# Patient Record
Sex: FEMALE | Race: WHITE
[De-identification: names, ages, dates, MRNs, and addresses within clinical notes are randomized per-mention and may not be internally consistent; named-entity substitution may affect disease eponyms.]

---

## 2017-01-09 ENCOUNTER — HOSPITAL ENCOUNTER (OUTPATIENT)
Age: 72
Discharge: HOME | End: 2017-01-09
Payer: MEDICARE

## 2017-01-09 DIAGNOSIS — K21.9: Primary | ICD-10-CM

## 2017-01-09 DIAGNOSIS — K44.9: ICD-10-CM

## 2017-01-09 DIAGNOSIS — R05: ICD-10-CM

## 2017-01-09 PROCEDURE — 74220 X-RAY XM ESOPHAGUS 1CNTRST: CPT

## 2017-01-23 ENCOUNTER — HOSPITAL ENCOUNTER (OUTPATIENT)
Age: 72
Discharge: HOME | End: 2017-01-23
Payer: MEDICARE

## 2017-01-23 DIAGNOSIS — Z87.891: ICD-10-CM

## 2017-01-23 DIAGNOSIS — G23.1: Primary | ICD-10-CM

## 2017-01-23 DIAGNOSIS — Z66: ICD-10-CM

## 2017-01-23 DIAGNOSIS — Z51.5: ICD-10-CM

## 2017-01-23 DIAGNOSIS — F32.9: ICD-10-CM

## 2017-01-23 DIAGNOSIS — Z91.81: ICD-10-CM

## 2017-03-02 ENCOUNTER — HOSPITAL ENCOUNTER (OUTPATIENT)
Age: 72
Discharge: HOME | End: 2017-03-02
Payer: MEDICARE

## 2017-03-05 ENCOUNTER — HOSPITAL ENCOUNTER (EMERGENCY)
Age: 72
Discharge: HOME | End: 2017-03-05
Payer: MEDICARE

## 2017-03-05 ENCOUNTER — HOSPITAL ENCOUNTER (OUTPATIENT)
Age: 72
Discharge: ANOTHER HOSPITAL | End: 2017-03-05
Payer: MEDICARE
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
## 2017-03-05 DIAGNOSIS — I10: ICD-10-CM

## 2017-03-05 DIAGNOSIS — G23.1: ICD-10-CM

## 2017-03-05 DIAGNOSIS — F03.90: ICD-10-CM
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
## 2017-03-05 DIAGNOSIS — Z79.82: ICD-10-CM

## 2017-03-05 DIAGNOSIS — Z79.84: ICD-10-CM

## 2017-03-05 DIAGNOSIS — S01.01XA: Primary | ICD-10-CM

## 2017-03-05 DIAGNOSIS — Z91.81: ICD-10-CM

## 2017-03-05 DIAGNOSIS — E11.9: ICD-10-CM

## 2017-04-05 ENCOUNTER — HOSPITAL ENCOUNTER (OUTPATIENT)
Age: 72
Discharge: HOME | End: 2017-04-05
Payer: MEDICARE

## 2017-04-05 DIAGNOSIS — Z66: ICD-10-CM

## 2017-04-05 DIAGNOSIS — F32.3: ICD-10-CM

## 2017-04-05 DIAGNOSIS — R13.10: ICD-10-CM

## 2017-04-05 DIAGNOSIS — Z91.81: ICD-10-CM

## 2017-04-05 DIAGNOSIS — G23.1: ICD-10-CM

## 2017-04-05 DIAGNOSIS — N39.0: ICD-10-CM

## 2017-04-05 DIAGNOSIS — Z51.5: Primary | ICD-10-CM

## 2017-05-02 ENCOUNTER — HOSPITAL ENCOUNTER (OUTPATIENT)
Age: 72
Discharge: HOME | End: 2017-05-02
Payer: MEDICARE

## 2017-05-02 DIAGNOSIS — Z66: ICD-10-CM

## 2017-05-02 DIAGNOSIS — Z91.81: ICD-10-CM

## 2017-05-02 DIAGNOSIS — Z87.440: ICD-10-CM

## 2017-05-02 DIAGNOSIS — M25.512: ICD-10-CM

## 2017-05-02 DIAGNOSIS — R13.10: ICD-10-CM

## 2017-05-02 DIAGNOSIS — E11.9: ICD-10-CM

## 2017-05-02 DIAGNOSIS — G23.1: ICD-10-CM

## 2017-05-02 DIAGNOSIS — K21.9: ICD-10-CM

## 2017-05-02 DIAGNOSIS — F43.21: ICD-10-CM

## 2017-05-02 DIAGNOSIS — Z51.5: Primary | ICD-10-CM

## 2017-05-31 ENCOUNTER — HOSPITAL ENCOUNTER (OUTPATIENT)
Dept: HOSPITAL 76 - PC | Age: 72
Discharge: HOME | End: 2017-05-31
Attending: NURSE PRACTITIONER
Payer: MEDICARE

## 2017-05-31 DIAGNOSIS — R13.10: ICD-10-CM

## 2017-05-31 DIAGNOSIS — F32.9: ICD-10-CM

## 2017-05-31 DIAGNOSIS — Z87.440: ICD-10-CM

## 2017-05-31 DIAGNOSIS — Z91.81: ICD-10-CM

## 2017-05-31 DIAGNOSIS — G23.1: ICD-10-CM

## 2017-05-31 DIAGNOSIS — M25.512: ICD-10-CM

## 2017-05-31 DIAGNOSIS — Z51.5: Primary | ICD-10-CM

## 2017-05-31 DIAGNOSIS — Z66: ICD-10-CM

## 2017-05-31 NOTE — CONSULTATION NOTE
DATE OF CONSULTATION: 2017 00:00:00

 

REQUESTING PROVIDER: Cherrie Alas MD.

 

TIME OF VISIT: [id number].

 

TOPIC: Followup palliative care consult.

 

Thank you, Dr. Alas for asking the palliative care consult service to be involved in the care of y
our patient and continue to provide support for symptom management and goals of care, as well as barrera
sition planning.

 

The patient is seen in her home setting, which is the dementia unit at [facility]. It is considerable and taxing effort for her to leave the home. She has progressive symptoms 
and sequela from her progressive supranuclear palsy.

 

BRIEF HISTORY OF PRESENT ILLNESS UPDATE: This is a zen 72-year-old woman who has worsening progres
sive supranuclear palsy originally noted as far as symptoms in . She has been having rapid functi
onal decline over the last several months to a year with visual deficits worsening, some change in he
r cognition with impulsivity and difficulty with choking and swallowing, as well as functional declin
e. In the context of progression of her disease she would be considered late stage. I am seeing her t
morris because she had told the staff that she had wanted to die. She reports that she is uncomfortable
 all the time. Staff reports she has been intermittently tearful and has had a sad weekend. Has not h
ad any suicidal action. I did followup with Chidi, who is her sister, she had the same experience, t
he patient wanted to "kill herself" and I am trying to discern the patient's current level of distres
s and level of depression today. The patient does admit to feeling like she is dying. Does report she
 is somewhat scared and anxious about this. When asked if she wanted further information she declined
. She does express feelings of overwhelming loss, recognizing she is not going to return to any level
 of independence and continues to become more and more dependent. The patient is quite tearful, able 
to express her sadness and did explore her feelings around this. This is the first time she has reall
y admitted to depression beyond grief and depressive symptoms,  and is willing to explore antidepress
ants in the context of this. She is in a single room now. Her sister is doing as much as she can to b
reak up the isolation and provide activities. The patient appreciates this, does enjoy human interact
ion, field trips and activities. She had been out on the bus today.

 

She does appear to have continued toning back in position, stiffening. She reports her vision changes
 are worsening, she sits with her head back and pressure on her neck, up most of the time. She has ha
d increased left shoulder pain, most recently has been trialed on tramadol 50 mg b.i.d. from her [facility] physician with some relief, but not significant improvement. She did have a fall last week t
hat did result in some left rib pain. On examination, she has some point tenderness just below her le
ft breast. Unable to palpate any displaced fracture. She is able to be assisted sitting and standing 
without any sign of pain behaviors. She was able to weight bear and ambulate a few steps. She is work
ing with physical therapy on a private-pay basis through [facility]. They have been working on stab
ilizing and slowing her gait by adding weight to her walker, stretching and working on lowering her t
one of her cervical extensors in hopes to help her swelling and posture and they are seeing her about
 2 times a week. She does have the right to refuse this and we did explore also accessing her benefit
 to be able to access speech therapy as well. She has been evaluated by Nandini Fairchild and would like t
o continue to work with her.

 

When asked what the patient worries about most, it continues to be her family. She does feel like a b
urden. This is both Chidi, her sister and her ex- Dexter who visits her once a week. She is ab
le to express some anticipatory grieving over leaving them and the sadness that they are going to fee
l upon her death.

 

SYMPTOM BURDEN: As noted above. Left shoulder pain does appear to be worsening as far severity. She d
oes report fatigue, does report she is sleeping better. She reports her appetite is good. She does pe
rceive her quality of life as quite limited.

 

CODE STATUS: THE PATIENT IS A DO NOT ATTEMPT RESUSCITATION, LIMITED ADDITIONAL INTERVENTIONS, USE OF 
ANTIBIOTICS IF LIFE CAN BE PROLONGED AND TRIAL OF MEDICALLY ASSISTED NUTRITION IF IT WAS GOING TO SEBASTIEN MCINTYRE QUALITY OF LIFE. ABRAHAM DEL CID IS HER SISTER AND DURABLE [facility]  FOR MEDICAL A
FFAIRS. HER PHONE NUMBER [phone], AND HER CELL [phone].

 

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
t age 55 related to her  was a bit older wanting her to be available to take care of him. Unfo
rtunately, her   last year in a car accident. Her family is very much supportive. She has 
lost her daughter to cancer. She does have a son who does visit her intermittently. She does have gra
ndchildren that check on her regularly as well. She is Jew by background and receives quite a bi
t of support from the ongoing weekly visits from the palliative care .

 

PERFORMANCE STATUS: The patient does need major assistance and cueing for all transfers. She does hav
e trouble with her posture given her vision limitations and her tendency to tip. She has had some imp
ulsivity. She has now had another fall and would put her at a palliative care performance status at 5
0%, of course, what is concerning is her progressive dysphagia. She did see the speech therapist who 
did progress her diet and provided some fairly prescriptive instructions as far as precautions. Her s
ister is working on getting someone to be able to feed her. She has found someone for meals on Tuesda
y, Thursday all 3 meals, and  lunch and dinner.

 

REVIEW OF SYSTEMS:

ENT: Reports continued vision loss, is unable to really describe, but she can still see.

CARDIOVASCULAR: Denies chest pain, no further GERD.

RESPIRATORY: Denies shortness of breath though she does have intermittent choking with eating.

GASTROINTESTINAL: She is incontinent of stool, reports her appetite has been good.

GENITOURINARY: She most recently was treated last week with Cipro. She denies any further signs or sy
mptoms. She reports she is aware of when she has burning and foul odor.

MUSCULOSKELETAL: Continues to have muscle stiffness and intermittent tremors and some freezing. She i
s quite stiff when trying to move.

INTEGUMENTARY: Confirmed with caregivers no skin breakdown.

NEUROLOGIC: She does have some intermittent short-term memory deficits though she is able to particip
ate in the conversation. Her speech is getting much more difficult to understand, so it is hard to re
ally get a clear picture of her cognitive status though she was quite engaged and conversant today, p
articularly around her emotional distress.

PSYCHIATRIC: She does admit to grief and loss and does appear somewhat anxious, particularly around t
he recognition she is dying from her disease process.

ENDOCRINE: No history of diabetes or hypothyroidism.

HEMATOLOGIC/IMMUNOLOGIC: Recently treated for a urinary tract infection last week. Unfortunately was 
not able to get a culture and sensitivity. It was mixed corinne.

 

PHYSICAL EXAMINATION:

GENERAL: She is lying in bed. She does have her neck flexed back. She is gazing at me, able to make e
ye contact. Her speech is very soft and difficult to understand.

ENT: Mucous membranes are moist, no signs or symptoms of candidiasis.

NECK: Slightly thickened. Trachea midline.

RESPIRATORY: Breath sounds are diminished but clear.

CARDIOVASCULAR: Her temperature 96, O2 saturation on room air 95%, pulse is 68, blood pressure was 11
2/64.

EXTREMITIES: No lower extremity edema. She does need major assistance from getting from sitting to st
anding.

RESPIRATORY: No cough noted during conversation.

 

PALLIATIVE CARE DISCUSSION: Who is present, myself and the patient. It was as above, the patient admi
tted to the realization that she is dying and that things are not going to get better. Voiced concern
s over all the things that she has yet to do. She is worried of course about her family, and does adm
it to depression, is quite tearful through our conversation. In our discussion, we did discuss trying
 to address her looking at her pain, as well as her trying a medication for her depression. Unfortuna
tely at that point in time, I did not recognize reviewing the change of her pain medication. She just
 presents with overwhelming feeling of sadness and appears to be in fairly significant amount of exis
tential distress. 

 

I did follow up with a long lengthy conversation again with Chidi, her DPOA, recognizing that the pa
joel's awareness that she is dying. Had expressed to her sister, as well, "that I don't want it die.
" She has been trying to put together more support, more interactions, things that might improve her 
quality of life, recognizing that her quantity of life is limited. She has made arrangements for new 
full size bed with the ability to elevate the head. She continues to allow whatever services that [facility]
[place] be of help to her and take her out for rides, anything to do some work to make her life more kenny
able. We did broach the fact that the patient at some point will hit a decision point that may look l
rasta transitioning to hospice, particularly the patient does not want to extend her current quality of
 life or her perceived suffering. We did agree though that at this point in time we would address her
 issue of discomfort and try and lighten her depression with some medication. Reassurance and recogni
tion was given of what she is currently providing for her sister.

 

IMPRESSION: This is a 72-year-old woman with progressive supranuclear palsy who continues to have wor
sening functional and some cognitive decline. She does present today with major depressive disorder w
ith persistent pervasive feelings of sadness and distress. She is appropriately grieving her current 
loss of quality of life and has agreed to trial antidepressant recognizing of course this may or may 
not be of help to her. She continues to have pain and discomfort, suspect most likely acute pain from
 her ribs is a contusion.

 

RECOMMENDATIONS/COUNSELING DONE:

1. Progressive supranuclear palsy continues with both functional and considerable decline. It is diff
icult to tease out cognitive decline. Will have physical therapy come back and do an evaluation for s
afety, positioning and any equipment adjustments, as well as to address if there is further intervent
ion available for her pain relief of her left shoulder. Also, will have speech therapy come to help w
ith caregiver training for feeding and to assist with communication and voice quality.

2. Dysphagia. They do have an evaluation done from Nandini Fairchild, speech therapy with diet modificatio
ns and instructions for caregivers to follow. Nandini would like to continue to follow her to be able t
o modify as appropriate.

3. Left shoulder pain. I will go ahead and discontinue the Tramadol 50 mg b.i.d. secondary to its int
eractions with antidepressants. I did consult with her PCP and did in agreement to move forward with 
an opioids, will start with 1/2 tab t.i.d. and evaluate her tolerance and make sure she has no sedati
on, nausea, or difficulty with the medication with confusion, then moved to a full tab t.i.d. for bet
ter pain control. Did discuss with her sister related concerns about rib fracture. The patient jorge luis cornejo does not present with displaced symptoms. Would not recommend an x-ray at this point in time, as 
it would not change our treatment plan.

4. Major depressive disorder, poorly controlled. The patient now willing to consider antidepressant a
nd will go ahead and start her on escitalopram 10 mg daily and titrate accordingly. Orders were writt
en and are sent to facility and [facility]. Counseling was provided and acknowledged normalizing her cu
rrent feelings of loss and concern about her eminent decline related to her disease process.

5. Advanced care planning. I have spoken with her sister, Chidi, as far as moving forward on goals o
f care in the context of her continued current decline, it is a quite complex situation. She is at hi
gh risk for sequela of a fall or aspiration. I suspect we are moving into the 6 months or less progno
sis, the need to further define this patient's goals of care.

 

FACE-TO-FACE for Home health is considerable and taxing effort for the patient to leave the facility 
to receive services. She requires PT for evaluation of current safety equipment, the need for transfe
r retaining, OT for management of left shoulder pain for "frozen shoulder" and ST for assessment and 
caregiver training for feeding techniques, as well as communication.

 

TIME SPENT: 60 minutes with greater than 50% of this done in counseling and coordination of care with
in the facility with staff, followup with sister, review of symptom burden and anticipatory guidance.


 

 

 

DD:2017 18:47:00  DT: 2017 20:14  JOB #: [id number]  EXT JOB #:[id number]

## 2017-06-15 ENCOUNTER — HOSPITAL ENCOUNTER (OUTPATIENT)
Dept: HOSPITAL 76 - EMS | Age: 72
Discharge: ANOTHER HOSPITAL | End: 2017-06-15
Attending: SURGERY
Payer: MEDICARE

## 2017-06-15 ENCOUNTER — HOSPITAL ENCOUNTER (EMERGENCY)
Dept: HOSPITAL 76 - ED | Age: 72
Discharge: HOME | End: 2017-06-15
Payer: MEDICARE

## 2017-06-15 VITALS — DIASTOLIC BLOOD PRESSURE: 75 MMHG | SYSTOLIC BLOOD PRESSURE: 159 MMHG

## 2017-06-15 DIAGNOSIS — S01.01XA: Primary | ICD-10-CM

## 2017-06-15 DIAGNOSIS — Z79.84: ICD-10-CM

## 2017-06-15 DIAGNOSIS — G23.1: ICD-10-CM

## 2017-06-15 DIAGNOSIS — I10: ICD-10-CM
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
## 2017-06-15 DIAGNOSIS — S00.03XA: ICD-10-CM
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
## 2017-06-15 DIAGNOSIS — E11.9: ICD-10-CM
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
## 2017-06-15 DIAGNOSIS — F03.90: ICD-10-CM

## 2017-06-15 DIAGNOSIS — Z79.82: ICD-10-CM

## 2017-06-15 PROCEDURE — 72125 CT NECK SPINE W/O DYE: CPT

## 2017-06-15 PROCEDURE — 99284 EMERGENCY DEPT VISIT MOD MDM: CPT

## 2017-06-15 PROCEDURE — 70450 CT HEAD/BRAIN W/O DYE: CPT

## 2017-06-15 PROCEDURE — 12001 RPR S/N/AX/GEN/TRNK 2.5CM/<: CPT

## 2017-06-15 NOTE — ED PHYSICIAN DOCUMENTATION
PD HPI HEAD INJURY





- Stated complaint


Stated Complaint: GLF





- Chief complaint


Chief Complaint: General





- History obtained from


History obtained from: EMS





- History of Present Illness


Mechanism of head injury: Fell, Laceration


Where head injury occurred: Home


Timing - onset: Enter  time (0230), Today


Location of injury: Back


Quality of pain: Pain


Associated symptoms: Amnesia.  No: LOC, AMS, Nausea / vomiting, Neck pain, 

Paresthesias, Seizures, Ear drainage, Nasal drainage


Symptoms improve with: Rest


Symptoms worsen with: Palpation, Movement


Contributing factors: No: Anticoagulated


Similar symptoms before: Has not had sx before


Recently seen: Not recently seen





- Additional information


Additional information: 





73 y/o female with advanced dementia due to supranuclear palsy did not push her 

call button to go to the bathroom and fell in the bathroom striking the back of 

her head. She denies neck pain, nausea or dizziness. 





Review of Systems


Unable to obtain: Dementia


Constitutional: denies: Fever


Respiratory: denies: Cough


GI: denies: Vomiting, Diarrhea


Neurologic: denies: Generalized weakness, Focal weakness, Numbness





PD PAST MEDICAL HISTORY





- Past Medical History


Past Medical History: Yes


Cardiovascular: Hypertension, High cholesterol


Respiratory: None


Neuro: Dementia


Endocrine/Autoimmune: Type 2 diabetes


GI: None


: None


HEENT: None


Psych: None


Musculoskeletal: None


Derm: None





- Past Surgical History


Past Surgical History: Yes


General: Appendectomy


/GYN: Hysterectomy





- Present Medications


Home Medications: 


 Ambulatory Orders











 Medication  Instructions  Recorded  Confirmed


 


Aspirin [Aspir 81] 81 mg PO DAILY 09/24/13 03/05/17


 


Losartan [Cozaar] 25 mg PO DAILY 09/24/13 03/05/17


 


Metformin HCl [Glumetza] 500 mg PO BID 09/24/13 03/05/17


 


Pravastatin Sodium 40 mg PO HS 09/24/13 03/05/17


 


Brimonidine 0.2% Ophth Drops 1 drops BID 03/05/17 03/05/17





[Alphagan P 0.2% Ophth Drops]   


 


Dorzolamide 2% Ophth Drops 1 drops BID 03/05/17 03/05/17





[Trusopt 2% Ophth Drops]   


 


Erythromycin Base [Erythromycin 1 applic BID 03/05/17 03/05/17





Ophthalmic Ointment]   


 


Latanoprost 0.005% Ophth Drops 1 drops DAILY 03/05/17 03/05/17





[Xalatan Ophth Drops]   














- Allergies


Allergies/Adverse Reactions: 


 Allergies











Allergy/AdvReac Type Severity Reaction Status Date / Time


 


Iodinated Contrast- Oral and Allergy Severe Respiratory Verified 06/15/17 03:16





IV Dye     





[Iodinated Contrast Media -     





IV Dye]     


 


bee pollen [Bee Pollen] Allergy  Hives Verified 06/15/17 03:16














- Social History


Does the pt smoke?: No


Smoking Status: Never smoker


Does the pt drink ETOH?: No


Does the pt have substance abuse?: No





- Immunizations


Immunizations are current?: Yes





- POLST


Patient has POLST: No





PD ED PE NORMAL





- Vitals


Vital signs reviewed: Yes (hypertensive )





- General


General: Well developed/nourished, Other (The patient has a wide open eye stare 

and looks surprised. )





- HEENT


HEENT: Other (There is limited EOM. There is a 2cm laceration to the left 

occiput with a tender hematoma and no crepitance. )





- Neck


Neck: Other (The neck is rigid and extended but there is no bony tenderness. )





- Cardiac


Cardiac: RRR, No murmur





- Respiratory


Respiratory: No respiratory distress, Clear bilaterally





- Abdomen


Abdomen: Soft, Non tender





- Back


Back: No CVA TTP, No spinal TTP





- Derm


Derm: Normal color, Warm and dry, No rash





- Extremities


Extremities: No deformity, No edema





Results





- Vitals


Vitals: 


 Vital Signs - 24 hr











  06/15/17





  03:12


 


Temperature 37.1 C


 


Heart Rate 81


 


Respiratory 20





Rate 


 


Blood Pressure 163/101 H


 


O2 Saturation 96








 Oxygen











O2 Source                      Room air

















- Rads (name of study)


  ** CT cervical spine


Radiology: Prelim report reviewed (Impression: Stable degenerative changes in 

the cervical spine since 03/05/2017. No fracture is identified.), EMP read 

indepedently, See rad report





  ** CT head


Radiology: Prelim report reviewed (Impression: No acute or focal intracranial 

abnormality.), EMP read indepedently, See rad report





Procedures





- Laceration (location)


  ** scalp


Length in cm: 2


Wound type: Irregular, Clean


Neurovascular status: Sensory intact, Motor intact, Vascular intact


Anesthesia: Lidocaine 1%


Wound Preparation: Hibiclens, Irrigated copiously NS, Wound explored, To the 

base


Skin layer closure: Staples


Other: Patient tolerated well, No complications, Tetanus UTD


Complexity: Simple





PD MEDICAL DECISION MAKING





- ED course


Complexity details: reviewed old records, reviewed results, re-evaluated patient

, considered differential, d/w patient


ED course: 





73 y/o female with progressive supranuclear palsy fell in the bathroom this 

evening unwitnessed. She has a laceration to the scalp and this is stapled. CT 

of the head and neck were negative for acute. 





Departure





- Departure


Disposition: 01 Home, Self Care


Clinical Impression: 


Fall from slip, trip, or stumble


Qualifiers:


 Encounter type: initial encounter Qualified Code(s): W01.0XXA - Fall on same 

level from slipping, tripping and stumbling without subsequent striking against 

object, initial encounter





Scalp laceration


Qualifiers:


 Encounter type: initial encounter Qualified Code(s): S01.01XA - Laceration 

without foreign body of scalp, initial encounter


Condition: Stable


Instructions:  ED Laceration Scalp Stitch Or Stap


Follow-Up: 


Cherrie Alas DO [Provider Admit Priv/Credential] - 


Comments: 


staples out in 7-10 days

## 2017-06-15 NOTE — CT PRELIMINARY REPORT
Accession: [id number]

Exam: CT Cervical Spine W/O

 

IMPRESSION: Stable degenerative changes in the cervical spine since 03/05/2017. No fracture is identi
fied.

 

[facility]

 

SITE ID: [id number]

## 2017-06-15 NOTE — CT PRELIMINARY REPORT
Accession: [id number]

Exam: CT Head W/O

 

IMPRESSION: No acute or focal intracranial abnormality.

 

[facility]

 

SITE ID: [id number]

## 2017-06-22 ENCOUNTER — HOSPITAL ENCOUNTER (OUTPATIENT)
Dept: HOSPITAL 76 - PC | Age: 72
Discharge: HOME | End: 2017-06-22
Attending: NURSE PRACTITIONER
Payer: MEDICARE

## 2017-06-22 ENCOUNTER — HOSPITAL ENCOUNTER (OUTPATIENT)
Dept: HOSPITAL 76 - LAB.R | Age: 72
Discharge: HOME | End: 2017-06-22
Attending: NURSE PRACTITIONER
Payer: MEDICARE

## 2017-06-22 DIAGNOSIS — Z99.3: ICD-10-CM

## 2017-06-22 DIAGNOSIS — R30.9: Primary | ICD-10-CM

## 2017-06-22 DIAGNOSIS — M25.512: ICD-10-CM

## 2017-06-22 DIAGNOSIS — Z79.891: ICD-10-CM

## 2017-06-22 DIAGNOSIS — F32.9: ICD-10-CM

## 2017-06-22 DIAGNOSIS — Z51.5: Primary | ICD-10-CM

## 2017-06-22 DIAGNOSIS — Z91.81: ICD-10-CM

## 2017-06-22 DIAGNOSIS — Z87.440: ICD-10-CM

## 2017-06-22 DIAGNOSIS — G23.1: ICD-10-CM

## 2017-06-22 DIAGNOSIS — S01.91XD: ICD-10-CM

## 2017-06-22 DIAGNOSIS — R30.0: ICD-10-CM

## 2017-06-22 DIAGNOSIS — Z66: ICD-10-CM

## 2017-06-22 DIAGNOSIS — R13.10: ICD-10-CM

## 2017-06-22 DIAGNOSIS — M25.511: ICD-10-CM

## 2017-06-22 LAB
CUL URINE ADD CHARGE: (no result)
PH UR STRIP.AUTO: 6.5 PH (ref 5–7.5)
SP GR UR STRIP.AUTO: 1.01 (ref 1–1.03)
UA CHARGE (STRIP ONLY): (no result)
UA W/ MICROSCOPIC CHARGE: YES
UR CULTURE IF IND: (no result)
UROBILINOGEN UR STRIP.AUTO-MCNC: NEGATIVE MG/DL
WBC # UR MANUAL: (no result) /HPF (ref 0–5)

## 2017-06-22 PROCEDURE — 87077 CULTURE AEROBIC IDENTIFY: CPT

## 2017-06-22 PROCEDURE — 81001 URINALYSIS AUTO W/SCOPE: CPT

## 2017-06-22 PROCEDURE — 87086 URINE CULTURE/COLONY COUNT: CPT

## 2017-06-22 PROCEDURE — 81003 URINALYSIS AUTO W/O SCOPE: CPT

## 2017-06-23 NOTE — CONSULTATION NOTE
DATE OF CONSULTATION: 06/22/2017 00:00:00

 

REQUESTING PROVIDER: Cherrie Alas DO.

 

TIME OF VISIT: 12:45 to 01:45.

 

Thank you, Dr. Alas, for asking the palliative care consult service to be 
involved in the care of your patient and providing support for pain and symptom 
management, goals of care, as well as the transition planning.

 

The patient is seen in her home setting, which is the dementia unit at [facility] assisted living [facility]. It is considerable and taxing effort for her to 
leave the home. She has progressive symptoms and sequela from her progressive 
supranuclear palsy.

 

BRIEF HISTORY OF PRESENT ILLNESS UPDATE: This is a zen 72-year-old woman who 
has worsening progressive supranuclear palsy originally noted as far as 
symptoms back in 2011. She has had a rapid decline over the last several months 
to a year with continued visual deficits worsening, able to  only see blurred 
images, some change in her cognition with some fullness, impulsivity, 
difficulty with choking and swallowing, as well as functional decline. She is 
mostly wheelchair bound at this point. The patient last seen had an 
exacerbation of her depression and her bilateral shoulder pain. Today, she 
presents actually in a fairly good mood with good interaction. She is able to 
say she is not depressed and that she is not worrying about anything. She does 
appear with her voice though it is soft, a little bit more modulated. She is 
not tearing or crying through the visit today.

 

Her other presenting symptom is bilateral shoulder pain. She reports currently 
it is a 0/10, at worst prior to medication, initiation of the hydrocodone, it 
was 9/10. We had started her on hydrocodone 5/325 mg half tab t.i.d. and after 
1 week increased it to 1 tab t.i.d. She reports that it made her feel too foggy 
and more out of it, so she reports she wanted to decrease it back down. 
Currently, she is satisfied with her current regimen. She can have extra half 
tab for breakthrough pain if needed.

 

SYMPTOM BURDEN: Pain as noted above. She does have ongoing fatigue. She is 
sleeping well at night though, but still sleeps some through the day. Part of 
this is isolation and boredom, other is that she does tire easily with 
increased activity. She denies nausea. Her appetite has been good. She denies 
shortness of breath, depression as noted above, and perceives currently her 
quality of life is acceptable.

 

CODE STATUS: THE PATIENT IS A DO NOT ATTEMPT RESUSCITATION, LIMITED ADDITIONAL 
INTERVENTIONS, USE OF ANTIBIOTICS IF LIFE CAN BE PROLONGED AND TRIAL OF 
MEDICALLY ASSISTED NUTRITION IF IT WERE GOING TO SUSTAIN CURRENT QUALITY OF 
LIFE. SHAYNE  ________ IS HER SISTER AND DURABLE [facility]  FOR 
MEDICAL AFFAIRS. HER PHONE NUMBER [phone] AND HER CELL [phone].

 

BRIEF SOCIAL HISTORY: The patient unfortunately had to move from [facility] in to 
[facility]. She does have some short-term memory and cognitive deficits as 
well as impulsivity, but was unable to get her needs met over on the assisted 
living side. This has been a very difficult adjustment for her. She currently 
now, though, has her own apartment, she got her full bed and she is absolutely 
thrilled and her sister, Shayne, has done many things to try and support her 
far as decrease in isolation and breaking up her days. She very much enjoys the 
weekly visits from the palliative care , she is [employer] by background 
and her babatunde is important to her. She does have other family members that 
visit and Chidi has made arrangements for paid caregiver to come help with 
feeding.

 

PERFORMANCE STATUS: The patient needs major assistance and cueing for all 
transfers and bed mobility. She does have a tendency with her posture to tone 
back. Home health, PT and speech therapy have been working with her, going to 
trial a tilt-in-space wheelchair. She is dependent for bathing.

 

REVIEW OF SYSTEMS:

ENT: Reports continues vision loss, does have difficulty describing this.

CARDIOVASCULAR: Denies chest pain.

RESPIRATORY: Denies shortness of breath. She does have intermittent choking 
when she eats and drinks, drinking more so. This is quite frightening to her.

GASTROINTESTINAL: She is incontinent of stool. She did have some diarrhea last 
week. Reports that she is moving her bowels every second or third day. Denies 
constipation. She is currently not on any bowel medications.

MUSCULOSKELETAL: She continues to have muscle stiffening, intermittent tremors 
and some freezing. She is quite stiff to move.

NEUROLOGIC: She does have some short-term memory deficits though she is quite 
able to participate and respond in conversation. Her responses though are 
somewhat delayed. Her speech is more difficult for her and her voice is quite 
soft.

PSYCHIATRIC: She does appear to have improved mood. She was talking to her ex-
 Dexter on arrival, it does appear they talk frequently on the phone. 
This does provide her some emotional support. She is unable to really talk much
, but does listen to him extensively.

ENDOCRINE: No history of diabetes or hypothyroidism.

HEMATOLOGIC/IMMUNOLOGIC: She continues to have recurrent urinary tract 
infections. Unfortunately is describing burning and discomfort the last few 
days. Last culture was mixed corinne.

 

PHYSICAL EXAMINATION:

GENERAL: She is lying in the bed. She looks very comfortable in her new full 
bed. She is gazing and trying to keep contact with me. Her speech is very soft 
and more difficult to understand.

ENT: Mucous membranes are moist, no signs or symptoms of candidiasis.

NECK: Slightly thickened with her head tipped back. Trachea midline. 

RESPIRATORY: Breath sounds are diminished but clear.

CARDIOVASCULAR: Her temperature is 97.0, pulse is 72, O2 saturation is 93, 
blood pressure 108/62.

EXTREMITIES: She has no lower extremity edema. Does need major assistance in 
getting from sitting to standing and bed mobility.

 

PALLIATIVE CARE DISCUSSION: WHO WAS PRESENT: Myself, the patient and Cielo Guillaume
, nurse practitioner. The patient reports she is not worrying about anything, 
she has nothing that she really wants to talk about. She is feeling less 
depressed and more encouraged. She does have this "new person" who is helping 
her with feeding. She finds this quite challenging, as she talks so fast and 
cannot follow her. She reports that she "jabbers." Her biggest concern was 
getting her staples out today. We did not really explore anything further other 
than she is finding much support from the palliative care team.

 

IMPRESSION: This is a 72-year-old woman with progressive supranuclear palsy who 
continues to have worsening functional status. She did have a fall that 
resulted in staples on 06/15/2017. Her CT of her head and neck were 
unremarkable. She does present today with improved mood, better pain control 
and perceives better quality of life with the recent modifications to her 
bedroom with the bed and looking forward to getting the chair. She continues to 
be supported by the palliative care consult team through , social 
worker and nurse practitioner.

 

RECOMMENDATIONS/COUNSELING DONE:

1. Head laceration from a ground level fall. She had 2 staples. The area does 
appear well healed. The small amount of crusting of eschar area was clean. 
Staples were removed. Just a slight bit of oozing, is no area to really put a 
dressing on. They did not cut her hair. I did tell her though it will just work 
its way off as far as she when she is shampooing her hair.

2. Urinary tract infection symptoms. I am concerned, we did not have a CandS 
last time to treat. She has been treated twice now with Cipro. I did go ahead 
and get a cath specimen and hoping to further treat this. She is having 
discomfort and pain and burning and was very tender in obtaining the specimen. 
There are no signs or symptoms of candidiasis, but it is red and somewhat 
swollen in her vaginal area. Specimen was dropped off at [facility].

3. Dysphagia. She is being worked with Jasmin PIERRE and Nandini BROCK. They are 
expecting a tilt-in-space wheelchair, will continue to work with positioning 
and aspiration precautions and diet. I did speak with the director of nursing 
as far as a followup with the person feeding with them to create as less 
distracting environment so that she can concentrate on eating and not trying to 
talk.

4. Left shoulder pain. She is being well managed on her hydrocodone 1/2 tab 
t.i.d. She was unable to tolerate a larger dose. She does feel that her pain is 
well controlled on his current regimen. We will need to watch for constipation. 
Currently, she is moving her bowels without difficulty.

5. Major depressive disorder, poorly controlled. She has been started on 
escitalopram. At this point in time, it does appear we do not need to titrate 
this. Counseling has continued to provide as far as support through the 
interdisciplinary team.

 

TIME SPENT: 60 minutes with greater than 50% of this done in counseling and 
coordination of care, within the facility with staff, followup with the 
directive of nursing, as well as review of symptom burden, anticipatory 
guidance and obtaining a urine specimen and getting it to the lab.

 

ALLERGIES: NO KNOWN DRUG ALLERGIES.

 

MEDICATION LIST:

1. Woman's 1 daily multivitamin 1 time a day.

2. Brimonidine 0.2% eyedrops 1 drop both eyes 2 times a day. 

3. Torsemide HCL 2% eyedrops 1 in both eyes b.i.d..

4. Metformin 500 mg. 1 tab b.i.d..

5. Hydrocodone/acetaminophen 5/325 mg 1/2 tab t.i.d. around the clock.

6. Hydrocodone/acetaminophen tablet 5/325 mg  1/2 tab p.r.n..

7. Acetaminophen 500 mg. 1 tab every 5 hours p.r.n. for breakthrough pain.

8. Bisacodyl suppository 10 mg if constipation not relieved in 38 hours.

9. Blood glucose monitoring for signs and symptoms of hypoglycemia.

10. Milk of magnesia 30 mg q.24h as needed for constipation.

11. Robitussin syrup 100/10 mg/5 ml, 10 ml q.6h hours p.r.n. cough.

12. Pravastatin 40 mg daily.

13. Miralax 1/2 scoop daily for constipation.

14. Lexapro 10 mg daily.

15. Latanoprost 0.005% eyedrops, 1 drop b.i.d.

 

Of note, her weight is diminished through the week on 06/08 at 174.6, 06/15 at 
173.4, and 06/22 at 171.2.



ADDENDUM 6/23/2017 C & S not available, past microbiolgy had very little 
resistance. Has had two rounds of short CIPRO. Ordered Bactrim DS BID for 10 
days, called Chidi to give message to Dara and rX faxed to [employer] [facility] 
[employer]. Update and support to sister provided.

 

 

 

DD:06/22/2017 17:16:00  DT: 06/22/2017 18:33  JOB #: [id number]  EXT JOB #:[id number]

MTDD

## 2017-07-19 ENCOUNTER — HOSPITAL ENCOUNTER (OUTPATIENT)
Dept: HOSPITAL 76 - PC | Age: 72
Discharge: HOME | End: 2017-07-19
Attending: NURSE PRACTITIONER
Payer: MEDICARE

## 2017-07-19 DIAGNOSIS — Z66: ICD-10-CM

## 2017-07-19 DIAGNOSIS — Z91.81: ICD-10-CM

## 2017-07-19 DIAGNOSIS — Z51.5: Primary | ICD-10-CM

## 2017-07-19 DIAGNOSIS — Z87.440: ICD-10-CM

## 2017-07-19 DIAGNOSIS — Z79.891: ICD-10-CM

## 2017-07-19 DIAGNOSIS — M25.512: ICD-10-CM

## 2017-07-19 DIAGNOSIS — R13.10: ICD-10-CM

## 2017-07-19 DIAGNOSIS — F32.9: ICD-10-CM

## 2017-07-19 DIAGNOSIS — G23.1: ICD-10-CM

## 2017-07-19 NOTE — PROVIDER PROGRESS NOTE
Palliative Care Follow Up





- Referral


Referring Provider: Dr. Kaykay Alas


Time of Visit: [id number]


Referral setting: Assisted living (Patient seen at [facility], it is a taxing 

and considerable effort for her to get out of facility, as well as to evaluate 

current care plan and family conference)


Referral Reason: Progressive Supranuclear Palsy





- Information Sources


Records Reviewed: RN notes reviewed, Old records reviewed


History obtained from: Patient, Family (sister Chidi), Caregiver (Amy TANG)


Exam limitations: Clinical condition (Patient with more difficulty with speech 

and voice, hard to understand)





- History of Present Illness Update


Brief HPI Update: 





This is a zen 72 year old woman with progressive supranuclear palsy, who has 

had rapid decline even in the month since I last saw her. Her ability to 

swallow is very compromised both by her dysphagia, as well as her spastic neck, 

it is almost impossible to get her into a neutral position. Rehab services have 

been working with J brace, massage, and positioning in attempt to improve 

safety and positioning to aid with swallowing. The continued difficulty is she 

continues to choke, more prominently with food, but also on secretions. She 

does admit this is much more scarey and alarming to her than previously. It is 

also more concern for facility staff about her choking and having an incident. 

Speech has been working with CGs to educate on techniques.  There is a referral 

for Dr. Jaquez in process to evaluate if botox may help with rigidity and 

process. My evaluation is that no matter even with positioning is that with her 

progressive disease, even positioning is not going to make up for her poor 

diaphragmatic cough and affect on swallowing/vocal chords.  Thus our meeting 

today about goals of care and at juncture to discuss tube feedings.


Patient has had about 5 pound weight loss, amazingly has not had aspiration 

pneumonia, but has been treated almost monthly with AB for UTI, wonder if that 

is treating some of it. She is getting frustrated as she is having more 

difficulty communicating, voice is soft, and words hard to understand. She has 

some impulsivity, and STM issues in that she had a fall on 7/15 when she got up 

without calling. She does report her pain is much better, her depression 

improved, and still enjoys her relationships with family and friends. 





Social History





- Living Situation


Living arrangement: Assisted living


Living Situation: With family (sister oversees care, has hired some private 

assistance for feeding and socialization), With caregiver(s)


Support System: 





Patient has had rapid decline, moved into [facility] end of last year and because 

of choking and increased needs transitioned over to [facility]. Her sister and 

family are her lifeline, she is very friendly with staff, has an ex  

Dexter who provides support as well. She gets weekly visits from the . 





Medications/Allergies





- Medications


Home Medications: 


 Ambulatory Orders











 Medication  Instructions  Recorded  Confirmed


 


Aspirin [Aspir 81] 81 mg PO DAILY 09/24/13 07/19/17


 


Losartan [Cozaar] 25 mg PO DAILY 09/24/13 07/19/17


 


Metformin HCl [Glumetza] 500 mg PO BID 09/24/13 07/19/17


 


Pravastatin Sodium 40 mg PO HS 09/24/13 07/19/17


 


Brimonidine 0.2% Ophth Drops 1 drops BID 03/05/17 07/19/17





[Alphagan P 0.2% Ophth Drops]   


 


Dorzolamide 2% Ophth Drops 1 drops BID 03/05/17 07/19/17





[Trusopt 2% Ophth Drops]   


 


Latanoprost 0.005% Ophth Drops 1 drops DAILY 03/05/17 07/19/17





[Xalatan Ophth Drops]   


 


Acetaminophen 500 mg PO Q6HR PRN 07/19/17 07/19/17


 


Bisacodyl Supp [Dulcolax Supp] 10 mg PO DAILY PRN 07/19/17 07/19/17


 


Escitalopram Oxalate 10 mg PO DAILY 07/19/17 07/19/17


 


HYDROcod/ACETAM 5/325 [Norco 5/325] 0.5 tab PO Q8HR PRN 07/19/17 07/19/17


 


HYDROcod/ACETAM 5/325 [Norco 5/325] 0.5 tab PO TID 07/19/17 07/19/17


 


Loperamide HCl [Loperamide] 1 tab PO Q6HR PRN 07/19/17 07/19/17


 


Magnesium Hydroxide [Milk of 30 ml PO DAILY PRN 07/19/17 07/19/17





Magnesia]   


 


Meloxicam 7.5 mg PO DAILY PRN 07/19/17 07/19/17


 


Multivitamin W/Minerals [Theragran 1 tab PO DAILY 07/19/17 07/19/17





M]   


 


Omeprazole [PriLOSEC] 20 mg PO DAILY 07/19/17 07/19/17


 


Polyethylene Glycol 3350 [Miralax] 8.5 gm PO DAILY 07/19/17 07/19/17


 


guaiFENesin/DEXTROMETHORPHAN 10 ml PO Q6HR PRN 07/19/17 07/19/17





[Robitussin Dm]   














- Allergies


Allergies/Adverse Reactions: 


 Allergies











Allergy/AdvReac Type Severity Reaction Status Date / Time


 


Iodinated Contrast- Oral and Allergy Severe Respiratory Verified 06/15/17 03:16





IV Dye     





[Iodinated Contrast Media -     





IV Dye]     


 


bee pollen [Bee Pollen] Allergy  Hives Verified 06/15/17 03:16














Review of Systems





- Constitutional


Constitutional: reports: Fatigue, Weakness, Weight loss





- Eyes


Eyes: reports: Blurred vision, Vision loss, Other (very watery)





- Ears, Nose & Throat


Ears, Nose & Throat: denies: Nasal congestion





- Cardiovascular


Cariovascular: reports: Decr. exercise tolerance





- Respiratory


Respiratory: reports: Cough (choking with eating and secretions)





- Gastrointestinal


Gastrointestinal: denies: Abdominal pain, Constipation





- Genitourinary


Genitourinary: denies: Dysuria, Frequency, Urgency





- Musculoskeletal


Musculoskeletal: reports: Muscle aches, Stiffness, Limited range of motion, 

Muscle weakness





- Integumentary


Integumentary: reports: Dryness





- Neurological


Neurological: reports: General weakness, Memory problems





- Psychiatric


Psychiatric: reports: Depression (improved)





- Endocrine


Endocrine: reports: Other (bs good range)





- Hematologic/Lymphatic


Hematologic/Lymphatic: reports: Recurrent infections (utis)





- All Other Systems


All Other Systems: reports: Reviewed and negative





Physical Examination





- Vital Signs


Temperature: 97.7 C


Pulse Rate: 63


Respiratory Rate: 18


O2 Saturation: 94 (ra)


Blood Pressure: 122/68





- Physical Exam


General Appearance: positive: Mild distress, Anxious


Eyes Bilateral: positive: Other (eyes watery with squinting)


ENT: positive: No signs of dehydration


Neck: positive: Stiff neck (thrust and toned)


Cardiovascular: positive: Regular rate & rhythm


Abdomen: positive: Nml bowel sounds


Skin: positive: Pallor, Dryness


Extremities: negative: No pedal edema


Neurologic/Psychiatric: positive: Oriented x3, Weakness, Slurred/abnml speech





Palliative Care





- POLST


Patient has POLST: Yes


POLST Status: DNR, Limited Interventions


Pain: Pain improved, Location ( right shoulder), Severity (none)


Drowsiness: Mild (1-3)


Nausea: None


Anxiety: Mild (1-3)


Dyspnea: None


Anorexia: None


Insomnia: Sleeps well


Constipation: Yes, Opoid induced, Managed


Feelings of wellbeing/Perceived Quality of Life: Worsening


Performance Status: 


Patient totally dependent for ADLs and feeding, limited rom of arms/tremors. 

Can foot pedal w/c , needs assist with transfers. Dislikes tilt in space in the 

context of "no brakes" and can't "wheel it" with her feet. 








- Palliative Care


Discussion: 


Patient discussion included sister Chidi, myself, and Cielo KOCH. 

Discussed options regarding PEG /tube feedings vs continued choking which is 

very scarey for her. She is aware another setting would be required, but is not 

ready to die and afraid of dying at this time. She still enjoys being with her 

friends and family, staff enjoy her here. In attempting to address anticipatory 

guidance as communication  is becoming very difficult, establishing a threshold 

to know when to stop the feeding. If perceived suffering or unable to be 

present with her loved ones, tried encouraging different weighs to communicate 

yes/no, on squeeze of no 2 for yes, then using thumbs up and thumbs down. Did 

use it in interpretting discussion to sister. Chidi had asked I meet with her 

first, knows she does not want to be a burden on her, and feels may share more 

honestly if I had spoken to her prior to family meeting. 








Impression and Recommendations





- Palliative Care


Impression: 





This is a 72 year old woman with PSP who demonstrates ongoing decline, mostly 

functional in nature manifested by increased choking, dysphagia, difficulty 

communicating, increased vision changes,weight loss and increased weakness. At 

critical juncture of needing to decide on feeding tube vs no tf and hospice 

support, patient goals to include pursuing PEG tube with hope to improve QOL 

and extend quantity as well. Family conference with sister to confirm goals, 

plan will necessitate moving to next level of care.


Recommendations/Counseling Done: 





1. Dysphagia. Discussed case with Nandini Fairchild SLP, only so much with 

positioning can do to facilitate eating, agreed most problem with neurological 

decline and choking is very scary to patient. Will use what precautions and 

resources currently available until transition. Spoke with Dr. Alas 

regarding family conference, will make referral to surgeon on Ai.


2. Left shoulder pain, improved with rehab support and TID 1/2 tab hydrocodone 

TID, no changes needed. Not using any BTP meds.


3. UTI, finished last course of AB on 7/4. Denies any recurrent symptoms. 


4. Major depressive disorder, single episode. Patient reports feelings of 

depression have much improved. Appears less tearful and able to tolerate very 

difficult conversation. Meeting weekly with .


5. Advanced Care Planning. Family conference for goals of care, will revisit 

again next week as very complicated decision making, weighing of benefits and 

burdens. Sister will start looking for other setting, MSW back next week will 

alert her to assist with transition. POLST in place. Discussion of how to 

support patient in current setting until then with DON.

## 2017-07-26 ENCOUNTER — HOSPITAL ENCOUNTER (OUTPATIENT)
Dept: HOSPITAL 76 - PC | Age: 72
Discharge: HOME | End: 2017-07-26
Attending: NURSE PRACTITIONER
Payer: MEDICARE

## 2017-07-26 DIAGNOSIS — Z66: ICD-10-CM

## 2017-07-26 DIAGNOSIS — G23.1: ICD-10-CM

## 2017-07-26 DIAGNOSIS — Z87.440: ICD-10-CM

## 2017-07-26 DIAGNOSIS — Z99.3: ICD-10-CM

## 2017-07-26 DIAGNOSIS — Z51.5: Primary | ICD-10-CM

## 2017-07-26 DIAGNOSIS — R13.10: ICD-10-CM

## 2017-07-26 DIAGNOSIS — M25.512: ICD-10-CM

## 2017-07-26 DIAGNOSIS — M54.9: ICD-10-CM

## 2017-07-26 NOTE — PROVIDER PROGRESS NOTE
Palliative Care Follow Up





- Referral


Referring Provider: Dr. Kaykay Alas


Time of Visit: 8592-3547


Referral setting: Assisted living


Referral Reason: PSP





- Information Sources


History obtained from: Patient


Exam limitations: Clinical condition (Some STM issues, voice is getting very 

soft and difficult to hear)





- History of Present Illness Update


Brief HPI Update: 





This is a zen 72 year old woman with progressive supranuclear palsy, who 

continues with decline, reports more difficulty with choking even since last 

week. Her ability to swallow is compromised by her dysphagia, spasticity in neck

, and difficult cough effort. PT/ST have been working with J brace, massage, 

and positioning, and has private CG Teo who is aiding with feeding, reports  

best technique is head forward and slowing down. Patient reports felt poorly 

for several days, but improved today, had fever, denies s/s UTI today, lungs 

are clear. Her voice is quite soft, more delayed speech in conversation, and 

concern about STM issues and impulsivity. Reports pain in shoulders/neck 

controlled, depression currently managed, and awaiting appointment with surgeon 

for PEG tube placement, Chidi having difficulty finding nursing home that will 

accept her, goal of today's visit to revisit goals of care.





Social History





- Living Situation


Living arrangement: Assisted living


Living Situation: Other (staff very attentive, paid cg to help with feeding)


Support System: 





Sister Chidi JUDGE, working very hard on finding alternative placement; Dexter 

ex  advocate and support for patient as well





Medications/Allergies





- Medications


Home Medications: 


 Ambulatory Orders











 Medication  Instructions  Recorded  Confirmed


 


Aspirin [Aspir 81] 81 mg PO DAILY 09/24/13 07/19/17


 


Losartan [Cozaar] 25 mg PO DAILY 09/24/13 07/19/17


 


Metformin HCl [Glumetza] 500 mg PO BID 09/24/13 07/19/17


 


Pravastatin Sodium 40 mg PO HS 09/24/13 07/19/17


 


Brimonidine 0.2% Ophth Drops 1 drops BID 03/05/17 07/19/17





[Alphagan P 0.2% Ophth Drops]   


 


Dorzolamide 2% Ophth Drops 1 drops BID 03/05/17 07/19/17





[Trusopt 2% Ophth Drops]   


 


Latanoprost 0.005% Ophth Drops 1 drops DAILY 03/05/17 07/19/17





[Xalatan Ophth Drops]   


 


Acetaminophen 500 mg PO Q6HR PRN 07/19/17 07/19/17


 


Bisacodyl Supp [Dulcolax Supp] 10 mg PO DAILY PRN 07/19/17 07/19/17


 


Escitalopram Oxalate 10 mg PO DAILY 07/19/17 07/19/17


 


HYDROcod/ACETAM 5/325 [Norco 5/325] 0.5 tab PO Q8HR PRN 07/19/17 07/19/17


 


HYDROcod/ACETAM 5/325 [Norco 5/325] 0.5 tab PO TID 07/19/17 07/19/17


 


Loperamide HCl [Loperamide] 1 tab PO Q6HR PRN 07/19/17 07/19/17


 


Magnesium Hydroxide [Milk of 30 ml PO DAILY PRN 07/19/17 07/19/17





Magnesia]   


 


Meloxicam 7.5 mg PO DAILY PRN 07/19/17 07/19/17


 


Multivitamin W/Minerals [Theragran 1 tab PO DAILY 07/19/17 07/19/17





M]   


 


Omeprazole [PriLOSEC] 20 mg PO DAILY 07/19/17 07/19/17


 


Polyethylene Glycol 3350 [Miralax] 8.5 gm PO DAILY 07/19/17 07/19/17


 


guaiFENesin/DEXTROMETHORPHAN 10 ml PO Q6HR PRN 07/19/17 07/19/17





[Robitussin Dm]   














- Allergies


Allergies/Adverse Reactions: 


 Allergies











Allergy/AdvReac Type Severity Reaction Status Date / Time


 


Iodinated Contrast- Oral and Allergy Severe Respiratory Verified 06/15/17 03:16





IV Dye     





[Iodinated Contrast Media -     





IV Dye]     


 


bee pollen [Bee Pollen] Allergy  Hives Verified 06/15/17 03:16














Review of Systems





- Constitutional


Constitutional: reports: Fatigue, Fever (reports felt feverish a couple of days 

ago, afebrile today and "feels normal again"), Weakness, Weight loss





- Eyes


Eyes: reports: Blurred vision (continues to worsen), Field loss, Vision loss





- Ears, Nose & Throat


Ears, Nose & Throat: reports: Other (voice getting very weak, reports choking 

continues to get worse).  denies: Hearing loss





- Cardiovascular


Cariovascular: denies: Chest pain, Edema, Lightheadedness





- Respiratory


Respiratory: reports: Cough, Other (SOB with choking episodes).  denies: Sputum 

production





- Gastrointestinal


Gastrointestinal: reports: Abdominal pain (now resolved, thinking may had flu 

like symptoms).  denies: Constipation, Diarrhea, Nausea





- Genitourinary


Genitourinary: reports: Incontinence.  denies: Dysuria, Frequency





- Musculoskeletal


Musculoskeletal: reports: Muscle aches, Stiffness, Limited range of motion, 

Muscle weakness





- Integumentary


Integumentary: reports: Dryness





- Neurological


Neurological: reports: Memory problems, Other (mostly wheelchair bound)





- Psychiatric


Psychiatric: reports: Depression (reports controlled), Anxiety (when choking).  

denies: Suicidal, Delusions, Hallucinations





- Endocrine


Endocrine: reports: Intolerance to heat





- Hematologic/Lymphatic


Hematologic/Lymphatic: reports: Recurrent infections (recurrent UTIs, denies 

current symptoms)





- All Other Systems


All Other Systems: reports: Reviewed and negative





Physical Examination





- Vital Signs


Temperature: 98.0 C


Pulse Rate: 73


Respiratory Rate: 18


O2 Saturation: 98 (ra)


Blood Pressure: 112/62





- Physical Exam


General Appearance: positive: Mild distress


Eyes Bilateral: positive: Other (eyes).  negative: No lid inflammation (tilted 

gaze)


ENT: positive: No signs of dehydration.  negative: Oral lesions


Neck: positive: Stiff neck


Respiratory: positive: Breath sounds nml, Other


Cardiovascular: positive: Regular rate & rhythm


Abdomen: positive: Nml bowel sounds


Skin: positive: Pallor


Extremities: positive: No pedal edema


Neurologic/Psychiatric: positive: Mood/affect nml (Participated in conversation

, speech slow but answers appropriate, revisited frequently information to 

assure understanding), Slurred/abnml speech





Palliative Care





- POLST


Patient has POLST: Yes


POLST Status: DNR, Limited Interventions


Pain: Pain unchanged, Location (left shoulder, back), Severity (reports 

currently controlled no concerns)


Drowsiness: Mild (1-3) (does spend a lot of time in bed, fatigue worsening)


Nausea: None


Anxiety: None


Dyspnea: None


Anorexia: None


Insomnia: Sleeps well


Constipation: No, Yes, Opoid induced, Managed


Feelings of wellbeing/Perceived Quality of Life: Worsening, Comment (concerned 

about choking and transition to new setting)


Performance Status: 


Is mostly wheelchair bound, can ambulate with contact assist short distances, 

dependent for feeding and ADLs








- Palliative Care


Discussion: 


Surrogate decision maker [Davie WESTFALL" [place] [phone]. Patient on bed, 

aware discussion to revisit decision and questions about tube feeding. Jossue 

having difficulty finding placement or someone to accept her, this has been of 

concern for all involved. Does not have appointment with surgeon yet. Revisited 

goal, if patient still wanted to pursue, patient still enjoys family, outings (

though getting more difficult) perceives quality of life as still worth living. 

Did ask me about if it is "reversible", counseled at the time she no longer 

wants to have TF or if she can't make that decision, and QOL is such not 

acceptable can stop, but would still be able to use PEG for medications which 

is very important to keep her comfortable at EOL when that time comes. She 

wanted to know if she could still eat some, counseled on risks/benefits, could 

still do ice cream etc. but would be less "volume" and risk at this time going 

in, at some point will not be safe and getting closer to have anything by mouth 

if choking. She perceives this is getting worse still, and is fearful. Reviewed 

the process, will have consult with surgeon, then set date, if gets sick or 

hospitalized may have to happen sooner or urgently. brannon Renteria but very 

good friend and support joined us at the end of visit on speaker phone, 

concerned about finding a good placement, he lives in [place] and is willing 

to be advocate for her if she is "place" further afield. Patient at this time, 

still saying wants to proceed, questions addressed, just waiting on 

coordination care.








Impression and Recommendations





- Palliative Care


Impression: 





This is a zen 72 year old woamn with PSP who demonstrates ongoing decline, 

mostly functional in nature manifrested by increased choking, dypsphagia, 

difficulting with communication,  and weigh loss. Patient's goals include 

currently moving forward with PEG placement, this will result in need for 

transition to another level of care. Patient's questions and concerns revisited 

and addressed, wants to move forward.


Recommendations/Counseling Done: 





1. Dysphagia. Patient's perception continues to progress as far as difficulty 

choking, awaiting PEG tube placement, timing is dependent on surgical referral 

and finding new setting. Counseling of placement of tube and feedings, 

implications, addressing concerns, and revisiting goals. Patient does want to 

proceed at this time, perceives QOL such that is not ready to transition EOL 

care yet. Patient concerns included "reversing" decision if wanted, reiterated 

need for PG tube for medications. Patient following aspiration precautions, 

working with team.


2. Left shoulder pain. Patient reports currently controlled.


3. Advanced Care Planning. Having difficulty finding placement for transition, 

left message for MSW if any further ideas. Will need to coordinate timing with 

PEG tube placement. Message left in follow up with sister regarding referral 

progress.


Time Spent: 





45 minutes with greater than 50% done in counseling regarding TF and goals of 

care, anticipatory guidance.

## 2017-08-14 ENCOUNTER — HOSPITAL ENCOUNTER (OUTPATIENT)
Dept: HOSPITAL 76 - PC | Age: 72
Discharge: HOME | End: 2017-08-14
Attending: NURSE PRACTITIONER
Payer: MEDICARE

## 2017-08-14 DIAGNOSIS — Z66: ICD-10-CM

## 2017-08-14 DIAGNOSIS — R63.4: ICD-10-CM

## 2017-08-14 DIAGNOSIS — T40.2X5A: ICD-10-CM

## 2017-08-14 DIAGNOSIS — Z51.5: Primary | ICD-10-CM

## 2017-08-14 DIAGNOSIS — G89.29: ICD-10-CM

## 2017-08-14 DIAGNOSIS — G23.1: ICD-10-CM

## 2017-08-14 DIAGNOSIS — F32.9: ICD-10-CM

## 2017-08-14 DIAGNOSIS — N39.0: ICD-10-CM

## 2017-08-14 DIAGNOSIS — M54.2: ICD-10-CM

## 2017-08-14 DIAGNOSIS — Z87.440: ICD-10-CM

## 2017-08-14 DIAGNOSIS — K59.03: ICD-10-CM

## 2017-08-14 DIAGNOSIS — M25.519: ICD-10-CM

## 2017-08-14 NOTE — PROVIDER PROGRESS NOTE
Palliative Care Follow Up





- Referral


Referring Provider: Dr. Kaykay Alas


Time of Visit: 8670-7224


Referral setting: Assisted living (Patient is seen in her home setting which is 

[facility] assisted living memory care section)


Referral Reason: PSP





- Information Sources


Records Reviewed: Old records reviewed


History obtained from: Patient, Caregiver


Exam limitations: Clinical condition (patient with some mild STM issues, very 

difficult speech to understand)





- History of Present Illness Update


Brief HPI Update: 


This is a zen 72 year old woman with progressive supranuclear palsy, who 

continues to decline. She has had increasing difficulty with swallowing and 

choking not only on her food but secretions as well. She did see Dr. Bailey 

regarding placement of a feeding tube, she has been vascilating in her 

messaging to various team members, and was seen in the setting by Speech 

Therapist Nandini Fairchild. There has been reluctance to move forward as finding 

placement once she has the PEG tube, will necessitate a change in her living 

situation. But [facility] is very clear, she is already concerned about being able 

to meet her needs with increasing symptom burden and as an assisted living not 

only the PEG tube, but her choking risk is more than they want to take on. She 

has lost another 5 pounds this week, she has presented with symptoms of a UTI, 

unfortunately they did NOT get a UA as requested so unclear if treating 

appropriately, is on tetracycline based on last micro and positive response. 

She reports her symptoms have resolved. The concern is her hypertonicity in her 

neck that results in hyperextenstion,leaving her airway open for food to 

penetrate her larynx. She had choking and coughing reported today both and 

breakfast and lunch, such that the residents were very upset, she was scared as 

well. She has a very poor ineffective cough. Even with the PEG tube, she would 

struggle with her secretions, I had her on her back for part of an exam, and 

was unable to clear her oral secretions unless turned on side, and needed 

assist to turn. 


Please see pallliative care discussion, but patient is "not ready to die" and 

wants to proceed with PEG tube placement at this time, will need transition 

plan in place before can set appt.








Social History





- Living Situation


Living arrangement: Assisted living (at [facility])


Support System: 


Dexter davila very concerned about patient getting good care, per patient has 

been exploring placement options [facility] Chidi Euceda patient's 

sister and DPOA, has been supporting her and trying to find placement, has been 

coming against many barriers, now at [facility] for week until 8/20. Left message to 

follow up








Medications/Allergies





- Medications


Home Medications: 


 Ambulatory Orders











 Medication  Instructions  Recorded  Confirmed


 


Aspirin [Aspir 81] 81 mg PO DAILY 09/24/13 08/14/17


 


Losartan [Cozaar] 25 mg PO DAILY 09/24/13 08/14/17


 


Metformin HCl [Glumetza] 500 mg PO BID 09/24/13 08/14/17


 


Pravastatin Sodium 40 mg PO HS 09/24/13 07/19/17


 


Brimonidine 0.2% Ophth Drops 1 drops BID 03/05/17 08/14/17





[Alphagan P 0.2% Ophth Drops]   


 


Dorzolamide 2% Ophth Drops 1 drops BID 03/05/17 08/14/17





[Trusopt 2% Ophth Drops]   


 


Latanoprost 0.005% Ophth Drops 1 drops DAILY 03/05/17 08/14/17





[Xalatan Ophth Drops]   


 


Acetaminophen 500 mg PO Q6HR PRN 07/19/17 08/14/17


 


Bisacodyl Supp [Dulcolax Supp] 10 mg PO DAILY PRN 07/19/17 08/14/17


 


Escitalopram Oxalate 10 mg PO DAILY 07/19/17 08/14/17


 


HYDROcod/ACETAM 5/325 [Norco 5/325] 0.5 tab PO Q8HR PRN 07/19/17 08/14/17


 


HYDROcod/ACETAM 5/325 [Norco 5/325] 0.5 tab PO TID 07/19/17 08/14/17


 


Loperamide HCl [Loperamide] 1 tab PO Q6HR PRN 07/19/17 08/14/17


 


Magnesium Hydroxide [Milk of 30 ml PO DAILY PRN 07/19/17 08/14/17





Magnesia]   


 


Meloxicam 7.5 mg PO DAILY PRN 07/19/17 08/14/17


 


Multivitamin W/Minerals [Theragran 1 tab PO DAILY 07/19/17 08/14/17





M]   


 


Omeprazole [PriLOSEC] 20 mg PO DAILY 07/19/17 08/14/17


 


Polyethylene Glycol 3350 [Miralax] 8.5 gm PO DAILY 07/19/17 08/14/17


 


guaiFENesin/DEXTROMETHORPHAN 10 ml PO Q6HR PRN 07/19/17 08/14/17





[Robitussin Dm]   


 


Saccharomyces Boulardii [Florastor] 250 mg PO BID 08/14/17 08/14/17


 


Tetracycline HCl 500 mg PO TID 08/14/17 08/14/17














- Allergies


Allergies/Adverse Reactions: 


 Allergies











Allergy/AdvReac Type Severity Reaction Status Date / Time


 


Iodinated Contrast- Oral and Allergy Severe Respiratory Verified 06/15/17 03:16





IV Dye     





[Iodinated Contrast Media -     





IV Dye]     


 


bee pollen [Bee Pollen] Allergy  Hives Verified 06/15/17 03:16














Review of Systems





- Constitutional


Constitutional: reports: Fatigue, Weakness, Weight loss (159.2 8/11 was 164 8/4)





- Eyes


Eyes: reports: Blurred vision (worsenng), Field loss, Vision loss





- Ears, Nose & Throat


Ears, Nose & Throat: reports: Hearing loss, Other (less able to speak)





- Cardiovascular


Cariovascular: denies: Chest pain, Edema





- Respiratory


Respiratory: reports: Cough, SOB with exertion





- Gastrointestinal


Gastrointestinal: reports: Constipation (intermittent), Other (has to eat 

slowing, increase in choking episodes)





- Genitourinary


Genitourinary: reports: Urgency, Incontinence.  denies: Dysuria





- Musculoskeletal


Musculoskeletal: reports: Stiffness, Limited range of motion, Muscle weakness





- Integumentary


Integumentary: reports: Dryness





- Neurological


Neurological: reports: General weakness, Memory problems





- Psychiatric


Psychiatric: reports: Depression (sadness, denies depression), Anxiety (with 

choking), Hallucinations (history per staff last week with UTI)





- Endocrine


Endocrine: reports: Intolerance to heat, Other (blood sugars every other day in 

good range about 120's)





- Hematologic/Lymphatic


Hematologic/Lymphatic: reports: Recurrent infections (UTIs, no aspiration 

pneumonia yet)





- All Other Systems


All Other Systems: reports: Reviewed and negative





Physical Examination





- Vital Signs


Temperature: 97.8 C


Pulse Rate: 71


Respiratory Rate: 18


O2 Saturation: 95 (RA AT REST)


Blood Pressure: 112/68





- Physical Exam


General Appearance: positive: Alert, Anxious


Eyes Bilateral: positive: No lid inflammation, Conjunctivae nml, Other (needs 

to tip back to see me; watery eyes)


ENT: positive: No signs of dehydration


Neck: positive: Stiff neck (hyperextended, examined her in bed)


Respiratory: positive: Breath sounds nml, Other (patient noted choking episode 

on secretions; very difficult to get deep enough breath to expel liquid)


Cardiovascular: positive: Regular rate & rhythm


Abdomen: positive: Non-tender, Nml bowel sounds, No distention


Skin: positive: Pallor


Extremities: positive: Other (bed mobility with assistance; pivot transfers and 

one person assist, can walk few steps but difficult with balance/hyperextension 

and vision)


Neurologic/Psychiatric: positive: Oriented x3, Weakness, Slurred/abnml speech





Palliative Care





- POLST


Patient has POLST: Yes


POLST Status: DNR, Limited Interventions


Pain: Pain unchanged, Location (shoulder/neck reports well controlled on 

current regimen)


Drowsiness: Mild (1-3)


Nausea: None


Anxiety: Mild (1-3)


Dyspnea: Mild (1-3)


Anorexia: None


Insomnia: Sleeps well


Constipation: Yes, Opoid induced, Managed (reports intermittently problem; on 

AB will not titrate today)


Feelings of wellbeing/Perceived Quality of Life: Worsening


Performance Status: 


Patient dependent for all ADLs including feeding and bed posititioning, though 

can make needs known when person nearby, difficulty summoning someone if in 

distress








- Palliative Care


Discussion: 


Discussed the TF again, as needing to move forward on concerns of facility, 

staff, and clinical team. Reviewed she has lost 5 pounds over a week. Reviewed 

her visit with the surgeon, she says she knew alot about the PEG tube as we had 

been discussing it for several visits now. I reviewed that she has been 

inconsistent in what she has been telling the different members of the team and 

her caregivers. She is telling me yes she wants to go ahead, yes she 

understands she will have to move and yes she knows she is getting worse but 

she is not ready to die still appreciates her current quality of life. She 

said she was not sure because she wanted the freedom of not having the tube

but had not appreciated not having the tube would mean at some point we would 

be doing comfort measures only. She would still like oral intake as long as 

possible, but after today does understand that it is hard on the staff when she 

is choking, and concern for safety if she is not able to clear her airway. 

Speech therapist was hoping to extend her time with more careful feeding and 

support. She is scheduled for swallow evaluation tomorrow. Unfortunately the 

PEG tube will only decrease her risk with eating, she is still going to be at 

risk because of her oral secretions. She was unable to define what a threshold 

for no further treatment would be. I do not believe given the complexity of her 

care, she will be able to stay in her current setting much longer with or 

without a PEG tube. She is now needing meds crushed, discussed could give them 

in her PEG tube and would be able to "skip this". For EOL a PEG tube will 

assist with management of comfort medications too, given her inability to clear 

oral meds/secretions. 








Impression and Recommendations





- Palliative Care


Impression: 


This is a zen unfortunate 72 year old woman with worsening PSP, with 

increasing difficulties managing her dysphagia. After much discussion, patient 

is currently expressing desire to move on with PEG tube feeding unfortunately 

her sister is not available this week, but will send her message. 





Recommendations/Counseling Done: 


1. Dysphagia secondary to progressive PSP. Patient due for swallow evaluation 

follow up tomorrow, call to Nandini BROCK with my concerns and findings 

today. Agreed PEG tube will be of benefit, but patient will remain at risk for 

aspiration with secretions. Patient is due to follow up with surgeon next week. 

Would recommend to coordinate with transition plan and placement, may consider 

inpatient stay for safety considerations in  transition.


2. Chronic pain neck and shoulder. Currently controlled with current regimen, 

in addition to massage PT/support.


3. Depression, despite multiple changes and decline patient remains remarkedly 

upbeat, continues to worry about the impact on those around her. She is seeing 

the Palliative Care  on weekly basis.


4. Advanced care planning. Has POLST, currently satisfied with current quality 

of life though admits it is declining. Will need transition plan, message left 

for sister. Spoke with facility RN Allie, will not be able to care for her 

much longer with or without PEG tube with her increasing care needs.


5. UTI. Positive urine dip and symptoms. Facililty was to obtain cathed UA, 

does not appear to have happened prior to starting AB based on last micro. 

Patients symptoms improved. Will need to continue to monitor as recurrent in 

nature. 





Time Spent: 





60 minutes with greate than 50% done in counseling of patient related to goals 

of care/PEG tube and anticipatory guidance. Coordination of care with facility 

staff and ST.

## 2017-08-15 ENCOUNTER — HOSPITAL ENCOUNTER (OUTPATIENT)
Dept: HOSPITAL 76 - DI | Age: 72
Discharge: HOME | End: 2017-08-15
Attending: SURGERY
Payer: MEDICARE

## 2017-08-15 DIAGNOSIS — R13.10: Primary | ICD-10-CM

## 2017-08-15 PROCEDURE — 74230 X-RAY XM SWLNG FUNCJ C+: CPT

## 2017-08-15 NOTE — XRAY REPORT
MODIFIED BARIUM SWALLOW:  08/15/2017

 

HISTORY:  Dysphagia.

 

FINDINGS/IMPRESSION:  Standard modified barium swallow performed under direction of the Speech Pathol
ogist.  The patient consumed pureed material under fluoroscopic guidance.  A videotape was obtained. 
 Fluoro time 42 seconds.

 

Prompt aspiration/penetration documented.  The procedure was terminated when this was identified.

 

Reference Speech Pathology report for more detailed procedural information. 

 

 

 

DD:08/15/2017 11:22:04  DT: 08/15/2017 12:54  JOB #: [id number]  EXT JOB #:[id number]

## 2017-08-22 ENCOUNTER — HOSPITAL ENCOUNTER (OUTPATIENT)
Dept: HOSPITAL 76 - SDS | Age: 72
Discharge: HOME | End: 2017-08-22
Attending: SURGERY
Payer: MEDICARE

## 2017-08-22 VITALS — SYSTOLIC BLOOD PRESSURE: 132 MMHG | DIASTOLIC BLOOD PRESSURE: 82 MMHG

## 2017-08-22 DIAGNOSIS — R13.10: Primary | ICD-10-CM

## 2017-08-22 DIAGNOSIS — Z87.891: ICD-10-CM

## 2017-08-22 DIAGNOSIS — G23.1: ICD-10-CM

## 2017-08-22 LAB
ANION GAP SERPL CALCULATED.4IONS-SCNC: 9 MMOL/L (ref 6–13)
BUN SERPL-MCNC: 13 MG/DL (ref 6–20)
CALCIUM UR-MCNC: 9.9 MG/DL (ref 8.5–10.3)
CHLORIDE SERPL-SCNC: 103 MMOL/L (ref 101–111)
CO2 SERPL-SCNC: 27 MMOL/L (ref 21–32)
CREAT SERPLBLD-SCNC: 0.8 MG/DL (ref 0.4–1)
GFRSERPLBLD MDRD-ARVRAT: 71 ML/MIN/{1.73_M2} (ref 89–?)
GLUCOSE SERPL-MCNC: 139 MG/DL (ref 70–100)
POTASSIUM SERPL-SCNC: 4.1 MMOL/L (ref 3.5–5)
SODIUM SERPLBLD-SCNC: 139 MMOL/L (ref 135–145)

## 2017-08-22 PROCEDURE — 0DH63UZ INSERTION OF FEEDING DEVICE INTO STOMACH, PERCUTANEOUS APPROACH: ICD-10-PCS | Performed by: SURGERY

## 2017-08-22 PROCEDURE — 49440 PLACE GASTROSTOMY TUBE PERC: CPT

## 2017-08-22 PROCEDURE — 80048 BASIC METABOLIC PNL TOTAL CA: CPT

## 2017-08-22 NOTE — OPERATIVE REPORT
DATE OF SURGERY:  08/22/2017 00:00:00

 

SURGEON: Liliam Bailey MD.

 

ASSISTANT: Kareem Hutton MD.

 

PREOPERATIVE DIAGNOSIS: Supragenicular palsy with dysphagia.

 

POSTOPERATIVE DIAGNOSIS: Supragenicular palsy with dysphagia.

 

NAME OF PROCEDURE: Percutaneous gastrostomy tube placement.

 

INDICATION FOR PROCEDURE: This is a 72-year-old female with progressive supragenicular palsy causing 
severe dysphagia with inability to take p.o.

 

FINDINGS: After obtaining informed consent, the patient was intubated by Anesthesia. She was prepped 
and draped in the usual sterile fashion and a time-out was taken according to protocol. The endoscope
 was introduced into the patient's stomach and the anterior abdominal wall was palpated to identify t
he location of the needle insertion site. The area was also transilluminated to ensure safe passage o
f the needle. An 18 mL syringe was then inserted and local anesthetic administrated. The introducer n
eedle was then placed through the abdominal cavity into the stomach. The guidewire was then inserted 
through the needle and was grasped with a snare from the endoscope. This was then withdrawn from the 
patient's mouth. The gastrostomy tube was then connected to the guidewire and was withdrawn back thro
ugh the patient's stomach out through the external abdominal wall. The endoscope was reintroduced, an
d a PEG tube was positioned ensuring that the flange was abutting the gastric mucosa, but was not not
ed to be too tight. The PEG tube was then secured into place at this location. The endoscope was then
 removed. The patient was extubated and taken to the recovery room in stable condition after tolerate
d the procedure well.

 

ESTIMATED BLOOD LOSS: Minimal.

 

COMPLICATIONS: None.

 

 

 

DD:08/22/2017 13:36:00  DT: 08/22/2017 14:05  JOB #: [id number]  EXT JOB #:[id number]

## 2017-08-23 ENCOUNTER — HOSPITAL ENCOUNTER (OUTPATIENT)
Dept: HOSPITAL 76 - PC | Age: 72
Discharge: HOME | End: 2017-08-23
Attending: NURSE PRACTITIONER
Payer: MEDICARE

## 2017-08-23 DIAGNOSIS — Z91.81: ICD-10-CM

## 2017-08-23 DIAGNOSIS — F32.9: ICD-10-CM

## 2017-08-23 DIAGNOSIS — Z87.440: ICD-10-CM

## 2017-08-23 DIAGNOSIS — F41.9: ICD-10-CM

## 2017-08-23 DIAGNOSIS — G23.1: ICD-10-CM

## 2017-08-23 DIAGNOSIS — Z51.5: Primary | ICD-10-CM

## 2017-08-23 DIAGNOSIS — R13.10: ICD-10-CM

## 2017-08-23 DIAGNOSIS — Z79.84: ICD-10-CM

## 2017-08-23 DIAGNOSIS — E46: ICD-10-CM

## 2017-08-23 DIAGNOSIS — Z93.1: ICD-10-CM

## 2017-08-23 DIAGNOSIS — Z66: ICD-10-CM

## 2017-08-23 DIAGNOSIS — M25.512: ICD-10-CM

## 2017-08-23 DIAGNOSIS — E11.9: ICD-10-CM

## 2017-08-23 PROCEDURE — 99310 SBSQ NF CARE HIGH MDM 45: CPT

## 2017-08-23 NOTE — PROVIDER PROGRESS NOTE
Palliative Care Follow Up





- Referral


Referring Provider: Dr. Cherrie Alas


Time of Visit: 3650-3707


Referral setting: Skilled Nursing Facility (New setting for patient, just moved 

in yesterday; staff are not familiar with her yet)


Referral Reason: Progressive Supranuclear Palsey (PSP)





- Information Sources


Records Reviewed: RN notes reviewed


History obtained from: Patient, Caregiver (clinical staff are new to patient)


Exam limitations: Clinical condition (patient with very difficult speech to 

understand as her ability to move air through her vocal cords has diminished)





- History of Present Illness Update


Brief HPI Update: 


This is a zen unfortunate 72 year old woman with progressive supranulcear 

palsy, who has had original symptoms back in 2011. Dhes has had a rapid decline 

over the last several year that has necessitated move from home to assist 

living at [facility], then to [facility] and now to [facility]. Related to her 

disease process she is unable to aim her eyes properly, has increasing visual 

deficits, so for her to see faces or have conversation needs to tip head back 

and have objects close. The other significant issue is the loss of balance with 

walking and/or transferring and will find her falling/leaning back and has had 

many falls as a result. Finally the most dramatic symptom is the change in 

ability to swallow, this has reached a point where she coughs and chokes or any 

oral intake including her secretions. As a result of the severe decline the 

last week, she had a PEG tube place 8/22. Speech therapy had noted she has an 

"absent cough" and when she clears her airway it is with a forceful adducting 

of her vocal chords and this can make a horrendous loud high pitched cough/

sound that was often frightening to staff and other residents, she is very 

embarrassed with this happens. After weighing benefits and burdens agains 

transitioning to hospice, patient still feels has quality of life though 

understands things are changing quickly, and wanted to proceed, it was almost 

too late as the choking and cough had peaked over this last week. She was 

having weight loss with decreased intake, and developed a UTI most likely 

contributed to by her decrease in fluids.


Today I find her with much confusion over her transition orders. Surgeon had 

them wait 24 hours before starting feeding, unfortunately she is choking/

coughing is needing to be NPO, they had tried medications with much difficulty 

today. Was seen by facility [facility] with recommendations minimal oral 

challenges, was to work on managing secretions, and confirmed with Nandini BROCK who saw her last at [facility] on Friday to NOT have her do any thing oral. 

Patient has had not intake other than few bites with meds since admission 

yesterday. 








Social History





- Living Situation


Living arrangement: Nursing home


Support System: 


Mary is paid caregiver that provides support/outings for patient. Dexter is 

exhusband still very much committed to their friendship, and sister Chidi Euceda is DPOA and has been instrumental is helping Dara get care needs met and 

managing her affairs. She has a son who visits some too. 








Medications/Allergies





- Medications


Home Medications: 


 Ambulatory Orders











 Medication  Instructions  Recorded  Confirmed


 


Aspirin [Aspir 81] 81 mg PEG DAILY 09/24/13 08/22/17


 


Losartan [Cozaar] 25 mg PEG DAILY 09/24/13 08/23/17


 


Brimonidine 0.2% Ophth Drops 1 drops EACHEYE BID 03/05/17 08/23/17





[Alphagan P 0.2% Ophth Drops]   


 


Dorzolamide 2% Ophth Drops 1 drops EACHEYE BID 03/05/17 08/23/17





[Trusopt 2% Ophth Drops]   


 


Latanoprost 0.005% Ophth Drops 1 drops EACHEYE DAILY 03/05/17 08/23/17





[Xalatan Ophth Drops]   


 


Acetaminophen 500 mg PEG Q6HR PRN 07/19/17 08/22/17


 


Bisacodyl Supp [Dulcolax Supp] 10 mg OR DAILY PRN 07/19/17 08/23/17


 


Escitalopram Oxalate 10 mg PEG DAILY 07/19/17 08/23/17


 


HYDROcod/ACETAM 5/325 [Norco 5/325] 2.5 mg PEG Q8HR PRN 07/19/17 08/23/17


 


Loperamide HCl [Loperamide] 10 ml PEG Q6HR PRN 07/19/17 08/23/17


 


Magnesium Hydroxide [Milk of 30 ml PEG DAILY PRN 07/19/17 08/23/17





Magnesia]   


 


Multivitamin W/Minerals [Theragran 10 ml PEG DAILY 07/19/17 08/23/17





M]   


 


Omeprazole [PriLOSEC] 20 mg PEG DAILY 07/19/17 08/23/17


 


Polyethylene Glycol 3350 [Miralax] 8.5 gm PEG DAILY 07/19/17 08/23/17


 


guaiFENesin/DEXTROMETHORPHAN 10 ml PEG Q6HR PRN 07/19/17 08/23/17





[Robitussin Dm]   


 


Saccharomyces Boulardii [Florastor] 250 mg PEG BID 08/14/17 08/23/17














- Allergies


Allergies/Adverse Reactions: 


 Allergies











Allergy/AdvReac Type Severity Reaction Status Date / Time


 


Iodinated Contrast- Oral and Allergy Severe Respiratory Verified 06/15/17 03:16





IV Dye     





[Iodinated Contrast Media -     





IV Dye]     


 


bee pollen [Bee Pollen] Allergy  Hives Verified 06/15/17 03:16


 


morphine AdvReac Intermediate Hallucinati Verified 08/22/17 09:59





   ons  














Review of Systems





- Constitutional


Constitutional: reports: Fatigue, Weakness, Weight loss.  denies: Fever, Chills





- Eyes


Eyes: reports: Blurred vision, Field loss, Vision loss





- Ears, Nose & Throat


Ears, Nose & Throat: reports: Other (voice weakening)





- Cardiovascular


Cariovascular: reports: Decr. exercise tolerance.  denies: Chest pain





- Respiratory


Respiratory: reports: Cough (high pitch; exacerbated with attempts for food/

fluid).  denies: Wheezing





- Gastrointestinal


Gastrointestinal: denies: Abdominal pain, Abdominal distention, Nausea, Vomiting





- Genitourinary


Genitourinary: reports: Incontinence (mild;only if not toileted urgently), 

Other (recent UTI)





- Musculoskeletal


Musculoskeletal: reports: Muscle aches, Stiffness, Limited range of motion, 

Muscle weakness





- Integumentary


Integumentary: reports: Dryness, Other (new PEG site)





- Neurological


Neurological: reports: General weakness, Memory problems, Abnormal gait





- Psychiatric


Psychiatric: reports: Depression (controlled currently; sad at decline), 

Anxiety (in new setting)





- Endocrine


Endocrine: reports: Other (patient with diabetes; not restarted on metformin 

will need to monitor)





- Hematologic/Lymphatic


Hematologic/Lymphatic: reports: Recurrent infections (UTIs ; so far no 

aspiration pneumonia but on AB frequently)





- All Other Systems


All Other Systems: reports: Reviewed and negative





Physical Examination





- Vital Signs


Temperature: 96.8 C


Pulse Rate: 83


Respiratory Rate: 18


O2 Saturation: 95


Blood Pressure: 120/72





- Physical Exam


General Appearance: positive: No acute distress, Alert


Eyes Bilateral: positive: No lid inflammation, Other (tipped back; difficulty 

focusing)


ENT: positive: Dry mucous membranes, Other (lips dry)


Neck: positive: Stiff neck (tipped back)


Respiratory: positive: Breath sounds nml.  negative: Wheezes, Rales, Rhonchi


Cardiovascular: positive: Regular rate & rhythm


Abdomen: positive: Other (TF just started about 30 mls in; bowel tones sluggish)


Skin: positive: Other (exit site without s/s of leakage or infections)


Extremities: positive: No pedal edema, Other (up to br)


Neurologic/Psychiatric: positive: Oriented x3, Weakness, Slurred/abnml speech





Palliative Care





- POLST


Patient has POLST: Yes


POLST Status: DNR, Limited Interventions


Pain: Pain unchanged, Location (usually in shoulders; patient reports overall 

improved; may consider stopping hydrocodone next visit)


Drowsiness: Mild (1-3)


Nausea: None


Anxiety: Mild (1-3)


Dyspnea: None


Anorexia: Weight loss


Insomnia: Sleeps well


Constipation: Yes, Opoid induced, Managed


Feelings of wellbeing/Perceived Quality of Life: Worsening


Performance Status: 


Previous level of function prior to this episode [patient dependent for all ADLs

, has been participating in program to maintain current function]. Current 

level of functioning [no change except no longer eating so not being fed]. 

Palliative Care Performance Status [40].








- Palliative Care


Discussion: 


Surrogate decision maker [Chidi Euceda]. Patient/Family understanding of the 

illness [Sister distressed at continued changes for Dara; patient understands 

the terminal nature of her disease; is hoping for longer time; but at time of 

transition wants to not suffer]. Information preferences [patient with some 

cognitive changes but understands most explainations if repeated allowed to ask 

questions]. Most important goals [quality of life]. Patient somewhat 

overwhelmed in communicating her needs in a new setting as staff at [facility] 

used to her. Still retaining her sense of humor through our conversation, 

hoping this will give her more quality of life without the choking as it is 

very scarey for her, and quantity of more time with her friends and family.








Results





- Lab Results


Lab results reviewed: Yes


Lab and Imaging Results: 





8/22 sodium 139; k 3.9





Impression and Recommendations





- Palliative Care


Impression: 


This is a zen 72 year old woman with PSP, with rapid progression of dysphagia

, minimal ability to clear airway, and recent to transition to nursing home to 

support new PEG tube feeding. Patient treatment plan with confusion on 

transition, at risk for sequela of dehydration, orders/calls made to address.





Recommendations/Counseling Done: 


1. Dysphagia secondary to PSP, at critical threshold. Confirmed with Nandini BROCK who had done barium swallow and visit last Friday, patient is not to 

be taking anything orally related to her risk. Goal has been to sustain her 

nutritionally as best able in her current setting until TF initiated. Chelle BROCK 

here, will follow and continue to evaluate for quality of life issues, but 

assistance with management of oral secretions and continued program. NPO order 

written with speech to modify as appropriate. Spoke to Chidi about obtaining 

ALS wheelchair to facility "tilt in space'. Had received meds orally today with 

coughing and choking.


2. Malnutrition. Unfortantely has not received other than few bites/sips with 

medications any meaningful intake and did not see dietician despite request 

urgently to do so on admit. Call to Zulay for support; given information 

recommendations made to patient needs 1800 calories, receiving Jevity 1.2 gem/

ml. Orders written to start 24 hours after PEG tube place to 10 ml, check 

residual every 4 hours if less than 50 to add 10 mls, no goal stated. 

Recommended final goal rate 70 mls/hr. Will need water needs would be 

calculated at 175 mls ever 4 hours. Call to Dr. Hutton on call for Dr. Bailey related situation and need to address fluid status; his 

recommendations were to be more agressive at this time do a 175 ml gravity 

water drain; continue with plan to add 10 ml; but hold only for residual 

greater than 200 ml; and check q 1 hour until less than and restart feeding. 

Further inst. were given to staff to keep HOB greater than 45 degrees; check 

residual and patient status if not any coughing or choking can still 

regurgitate with PEG tube. Ondansetron 8 mg ODT every 8 hours ordered as needed 

for nausea. Medicaitons to be given through tube. Plan reviewed with clinical 

staff. Weights weekly and BMP in follow up on Monday to assist with dietary 

recommendations. Zulay will see her Wednesday next week.


3. Left shoulder pain; patient perceives may be able to stop pain medication; 

agreed to re-evaluate next visit.


4. Depression, despite changes patient though expresses anxiety with transition 

feels she is doing okay. Palliative Care  visited earlier to offer 

support. 





Time Spent: 


75 minutes in counseling and coordination of care related to TF and transition 

to new setting.

## 2017-08-28 ENCOUNTER — HOSPITAL ENCOUNTER (OUTPATIENT)
Dept: HOSPITAL 76 - LAB.R | Age: 72
End: 2017-08-28
Attending: SKILLED NURSING FACILITY
Payer: MEDICARE

## 2017-08-28 DIAGNOSIS — N18.3: Primary | ICD-10-CM

## 2017-08-28 LAB
ANION GAP SERPL CALCULATED.4IONS-SCNC: 9 MMOL/L (ref 6–13)
BUN SERPL-MCNC: 15 MG/DL (ref 6–20)
CALCIUM UR-MCNC: 9.6 MG/DL (ref 8.5–10.3)
CHLORIDE SERPL-SCNC: 102 MMOL/L (ref 101–111)
CO2 SERPL-SCNC: 26 MMOL/L (ref 21–32)
CREAT SERPLBLD-SCNC: 0.7 MG/DL (ref 0.4–1)
GFRSERPLBLD MDRD-ARVRAT: 82 ML/MIN/{1.73_M2} (ref 89–?)
GLUCOSE SERPL-MCNC: 131 MG/DL (ref 70–100)
POTASSIUM SERPL-SCNC: 4.1 MMOL/L (ref 3.5–5)
SODIUM SERPLBLD-SCNC: 137 MMOL/L (ref 135–145)

## 2017-08-28 PROCEDURE — 80048 BASIC METABOLIC PNL TOTAL CA: CPT

## 2017-08-29 ENCOUNTER — HOSPITAL ENCOUNTER (OUTPATIENT)
Dept: HOSPITAL 76 - PC | Age: 72
Discharge: HOME | End: 2017-08-29
Attending: NURSE PRACTITIONER
Payer: MEDICARE

## 2017-08-29 DIAGNOSIS — T40.2X5A: ICD-10-CM

## 2017-08-29 DIAGNOSIS — Z51.5: Primary | ICD-10-CM

## 2017-08-29 DIAGNOSIS — K59.03: ICD-10-CM

## 2017-08-29 DIAGNOSIS — G23.1: ICD-10-CM

## 2017-08-29 DIAGNOSIS — Z79.891: ICD-10-CM

## 2017-08-29 DIAGNOSIS — Z66: ICD-10-CM

## 2017-08-29 DIAGNOSIS — B37.3: ICD-10-CM

## 2017-08-29 DIAGNOSIS — Z87.440: ICD-10-CM

## 2017-08-29 DIAGNOSIS — Z93.1: ICD-10-CM

## 2017-08-29 DIAGNOSIS — R49.0: ICD-10-CM

## 2017-08-29 DIAGNOSIS — G89.29: ICD-10-CM

## 2017-08-29 DIAGNOSIS — M25.519: ICD-10-CM

## 2017-08-29 DIAGNOSIS — E11.9: ICD-10-CM

## 2017-08-29 PROCEDURE — 99310 SBSQ NF CARE HIGH MDM 45: CPT

## 2017-08-29 NOTE — PROVIDER PROGRESS NOTE
Palliative Care Follow Up





- Referral


Referring Provider: Dr. Cherrie Alas


Time of Visit: 1259-2908


Referral setting: Skilled Nursing Facility


Referral Reason: PSP





- Information Sources


Records Reviewed: RN notes reviewed, Other


History obtained from: Patient, Family (sister Chidi had "list of concerns" 

wanted me to follow up from patient)


Exam limitations: Clinical condition (patient with more difficulty communicating

; voice very soft; some stm issues noted)





- History of Present Illness Update


Brief HPI Update: 


Please see 8/23 note for more extensive HPI. This is a zen 72 year old woman 

with progressive supranuclear palsy who recently had PEG tube place 8/22 for 

increased choking, dysphagia, and weight loss. She has been tolerating her new 

tube feeding, currently at 70 mls hr with total of 1400 ml/24 hours and 175 ml 

q4 hours flushes water. It was a bit of a complicated transition but things are 

settling down. Patient appears well hydrated, more alert, and denies depression 

or anxiety. She is going to be working with rehab team including Chelle BROCK to 

maximize function with the focus on quality of life. She is relieved to not be 

choking, is managing her oral secretions, and starting to adjust to new 

setting. Communication has been challenging as her voice is very weak, and 

looking at creating a communication board to assist. 








Social History





- Living Situation


Living arrangement: Nursing home (new setting for patient since last Monday)


Support System: 


Sister Chidi Euceda, visits regularly oversees her care and finances; paid cg 

Mary has shifts to come provide support and transportation; Dexter ex  

comes weekly to visit and provide support








Medications/Allergies





- Medications


Home Medications: 


 Ambulatory Orders











 Medication  Instructions  Recorded  Confirmed


 


Aspirin [Aspir 81] 81 mg PEG DAILY 09/24/13 08/22/17


 


Losartan [Cozaar] 25 mg PEG DAILY 09/24/13 08/23/17


 


Brimonidine 0.2% Ophth Drops 1 drops EACHEYE BID 03/05/17 08/23/17





[Alphagan P 0.2% Ophth Drops]   


 


Dorzolamide 2% Ophth Drops 1 drops EACHEYE BID 03/05/17 08/23/17





[Trusopt 2% Ophth Drops]   


 


Latanoprost 0.005% Ophth Drops 1 drops EACHEYE DAILY 03/05/17 08/23/17





[Xalatan Ophth Drops]   


 


Acetaminophen 500 mg PEG Q6HR PRN 07/19/17 08/22/17


 


Bisacodyl Supp [Dulcolax Supp] 10 mg UT DAILY PRN 07/19/17 08/23/17


 


Escitalopram Oxalate 10 mg PEG DAILY 07/19/17 08/23/17


 


HYDROcod/ACETAM 5/325 [Norco 5/325] 2.5 mg PEG Q8HR PRN 07/19/17 08/23/17


 


Loperamide HCl [Loperamide] 10 ml PEG Q6HR PRN 07/19/17 08/23/17


 


Magnesium Hydroxide [Milk of 30 ml PEG DAILY PRN 07/19/17 08/23/17





Magnesia]   


 


Multivitamin W/Minerals [Theragran 10 ml PEG DAILY 07/19/17 08/23/17





M]   


 


Omeprazole [PriLOSEC] 20 mg PEG DAILY 07/19/17 08/23/17


 


Polyethylene Glycol 3350 [Miralax] 8.5 gm PEG DAILY 07/19/17 08/23/17


 


guaiFENesin/DEXTROMETHORPHAN 10 ml PEG Q6HR PRN 07/19/17 08/23/17





[Robitussin Dm]   


 


Saccharomyces Boulardii [Florastor] 250 mg PEG BID 08/14/17 08/23/17














- Allergies


Allergies/Adverse Reactions: 


 Allergies











Allergy/AdvReac Type Severity Reaction Status Date / Time


 


Iodinated Contrast- Oral and Allergy Severe Respiratory Verified 06/15/17 03:16





IV Dye     





[Iodinated Contrast Media -     





IV Dye]     


 


bee pollen [Bee Pollen] Allergy  Hives Verified 06/15/17 03:16


 


morphine AdvReac Intermediate Hallucinati Verified 08/22/17 09:59





   ons  














Review of Systems





- Constitutional


Constitutional: reports: Fatigue, Weakness, Weight loss (lost 1.5 pounds this 

week 157)





- Eyes


Eyes: reports: Blurred vision (as a result of PSP), Vision loss, Corrective 

lenses





- Ears, Nose & Throat


Ears, Nose & Throat: reports: Other (dry mouth).  denies: Hearing loss





- Cardiovascular


Cariovascular: reports: Decr. exercise tolerance.  denies: Chest pain





- Respiratory


Respiratory: reports: Cough (less cough per patient report without having any 

thing orally; staff report less cough as well this week; managing oral 

secretions currently), SOB with exertion





- Gastrointestinal


Gastrointestinal: reports: Constipation (reports only BM once since here; 

confirmed with staff), Nausea (denies at time of visit; ondansetron given one 

time on records), Other (Tolerating at 70 ml in hour/bolus water 175 ml q4 hours

; worried about weight gain; reports feeling hungery; did meet with dietician 

today).  denies: Vomiting, Reflux/heartburn





- Genitourinary


Genitourinary: reports: Incontinence (at times), Other (complains of "itching 

and burning in groin"; recently on AB).  denies: Dysuria





- Musculoskeletal


Musculoskeletal: reports: Stiffness (flailed back and stiff with PSP)





- Integumentary


Integumentary: denies: Rash





- Neurological


Neurological: reports: General weakness, Focal weakness (listing to right in 

chair), Memory problems, Slurred speech (sister reports staff having difficult 

time understanding her; voice much more quiet)





- Psychiatric


Psychiatric: denies: Depression, Anxiety





- Endocrine


Endocrine: reports: Other (Blood sugars slightly eleveated are not fasting 

because of -150 range)





- Hematologic/Lymphatic


Hematologic/Lymphatic: reports: Recurrent infections (has had frequent UTIs not 

pneumonia yet)





- All Other Systems


All Other Systems: reports: Reviewed and negative





Physical Examination





- Vital Signs


Temperature: 97.8 C


Pulse Rate: 72


Respiratory Rate: 18


O2 Saturation: 95


Blood Pressure: 102/62





- Physical Exam


General Appearance: positive: No acute distress, Alert


Eyes Bilateral: positive: Conjunctivae nml


ENT: positive: No signs of dehydration


Neck: positive: Trachea midline, Stiff neck (tipped back with tone)


Respiratory: positive: Breath sounds nml, Other (diminished in bases difficulty 

taking full breath)


Cardiovascular: positive: Regular rate & rhythm


Abdomen: positive: Non-tender, Nml bowel sounds, No distention, Other (PEG exit 

site looks good; exam of vaginal area-thick white secretions; redness and 

irritation; uncomfortable when manipulating labia for exam)


Skin: positive: Pallor.  negative: Rash


Extremities: positive: Other (shuffle pivot transfer; needing significant cuing

; difficult to get to bend to sit; suppose to have HOB up 45 degrees to prevent 

aspiration)


Neurologic/Psychiatric: positive: Oriented x3, Mood/affect nml, Weakness, 

Slurred/abnml speech (speech difficult)





Palliative Care





- POLST


Patient has POLST: Yes


POLST Status: DNR, Limited Interventions


Pain: Pain unchanged, Location (bilateral shoulder pain; reports currently 

controlled does not want to change regimen; 2.5 mg hydrocodone TID)


Drowsiness: Mild (1-3), Comment (reports more fatigue; comfortable laying in bed

; not new behavior did this at [facility])


Nausea: None


Anxiety: None


Dyspnea: Mild (1-3) (with activity edwina)


Insomnia: Sleeps well


Constipation: Yes, Opoid induced, Unmanaged, Comment (will titrate bowel program

)


Feelings of wellbeing/Perceived Quality of Life: No change (Still perceives 

life as worth living; worries about her loved ones worrying about her)


Performance Status: 


Dependent for all ADLs, toileting; and cueing for transfers








- Palliative Care


Discussion: 


Surrogate decision maker []. Patient/Family understanding of the illness []. 

Information preferences []. Most important goals []. Patient/Family concerns []

. Family conference [].








Results





- Lab Results


Lab results reviewed: Yes


Lab and Imaging Results: 


 k4.1 BUN 9.0; Glucose 131; Ca 9.6








Impression and Recommendations





- Palliative Care


Impression: 


This is a 72 year old woman with PSP, recently initiated on tube feeding as 

lost ability to swallow/high risk for aspiration and to meet nutritional needs. 

This necessitated a move to higher skilled facility of SNF. She is tolerating 

well at 70 ml, with water bolus 175 ml q 4 hours no nausea or diarrhea. Having 

more difficulty in new setting in communicating her needs with new caregivers 

and with progressive loss of voice. 





Recommendations/Counseling Done: 


1. Vaginal candidiasis. Diflucan 100 mg ordered for 3 days. Patient recently 

prior to surgery had UTI treated with AB. 


2. Dysphagia related to PSP. Tolerating current Jevity at 1.2 at 70 mls in 

hour. Spoke with dietician, Zulay, recommended increase to 80 mls for less "

tube time" to accomodate patient's goals to be able to take "field trips" out. 

Sary BROCK is ordering "see's chocolate lollipops" to allow pleasure tastes. Tube 

feedings increased to 75 ml for 3 days, then 80 mls, to stop residual checks 

unless symptomatic. 


3. Diabetes Type II. Patient concerned about "elevated" BS reviewed not fasting 

and within a reasonable range, will monitor and adjust accordingly.


4. Constipation. Increased Miralax form 8.6 to 17 grams daily. Inst. to give 2 

senna now, and if no BM tomorrow biscodyl supp per Donalds bowel program.


5. Chronic shoulder pain. Discussed given her constipation and less stress on 

shoulders stopping hydrocodone, patient wants to continue at this time, feels 

effective regimen, expects to increase activity as time goes on.


6. Dysphonia secondary to PSP. Discussed at length with Sary BROCK about reader 

board to communicate with staff, both patient and staff get frustrated. Staff 

and patient in agreement.


7. Generalized weakness. patient notes continue less tolerance of sitting 

balance/activity. Will be working with rehab to maximize function. Patient very 

much as goal wants to participate for as long as possible in activities that 

bring her hitesh. Reports very much enjoyed outing with Dexter on ride to [facility].


8. Advanced care planning, patient continues to express resignation and 

acceptance of current situation. Denies depression or anxiety currently, 

worries about the burden on her loved ones and some about her impending 

decline. Counseling for normalizing her feelings regarding anticipatory grief. 

Will continue with [facility] , new volunteers to start, 

encouraged to participate in activities in new setting as has energy too.





Time Spent: 


Time spent 60 minutes with greater than 50% done in counseling on pain/

depression and bowels, coordination of care with facility staff, dietician and 

sister Chidi.

## 2017-09-08 ENCOUNTER — HOSPITAL ENCOUNTER (OUTPATIENT)
Dept: HOSPITAL 76 - LAB.R | Age: 72
Discharge: HOME | End: 2017-09-08
Attending: FAMILY MEDICINE
Payer: MEDICARE

## 2017-09-08 DIAGNOSIS — R82.99: Primary | ICD-10-CM

## 2017-09-08 LAB
PH UR STRIP.AUTO: 6.5 PH (ref 5–7.5)
SP GR UR STRIP.AUTO: <=1.005 (ref 1–1.03)
UA CHARGE (STRIP ONLY): YES
UA W/ MICROSCOPIC CHARGE: (no result)
UROBILINOGEN UR STRIP.AUTO-MCNC: NEGATIVE MG/DL

## 2017-09-08 PROCEDURE — 81003 URINALYSIS AUTO W/O SCOPE: CPT

## 2017-09-08 PROCEDURE — 81001 URINALYSIS AUTO W/SCOPE: CPT

## 2017-09-13 ENCOUNTER — HOSPITAL ENCOUNTER (OUTPATIENT)
Dept: HOSPITAL 76 - PC | Age: 72
Discharge: HOME | End: 2017-09-13
Attending: NURSE PRACTITIONER
Payer: MEDICARE
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
## 2017-09-13 DIAGNOSIS — R49.0: ICD-10-CM

## 2017-09-13 DIAGNOSIS — T40.2X5A: ICD-10-CM

## 2017-09-13 DIAGNOSIS — K59.03: ICD-10-CM

## 2017-09-13 DIAGNOSIS — G23.1: ICD-10-CM

## 2017-09-13 DIAGNOSIS — Z51.5: Primary | ICD-10-CM

## 2017-09-13 DIAGNOSIS — S49.92XA: ICD-10-CM

## 2017-09-13 DIAGNOSIS — Z91.81: ICD-10-CM

## 2017-09-13 DIAGNOSIS — Z93.1: ICD-10-CM
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
## 2017-09-13 DIAGNOSIS — Z66: ICD-10-CM

## 2017-09-13 PROCEDURE — 99310 SBSQ NF CARE HIGH MDM 45: CPT

## 2017-09-13 NOTE — PROVIDER PROGRESS NOTE
Palliative Care Follow Up





- Referral


Referring Provider: Dr. Cherrie Alas


Time of Visit: 11:15-12:15


Referral setting: Skilled Nursing Facility (Patient now at [facility])


Referral Reason: PSP





- Information Sources


Records Reviewed: RN notes reviewed, Old records reviewed


History obtained from: Patient, Family (follow up with sister, Chidi)


Exam limitations: Clinical condition (Patient able participate in exam; some 

inconsistency with questions/STM but not confused)





- History of Present Illness Update


Brief HPI Update: 


Please see 8/23 note for more extensive HPI. This is a zen 72-year-old woman 

with progressive supranuclear palsy and had PEG tube placed on 8/22 for 

symptoms of disease progression that included choking, dysphagia, and ongoing 

weight loss. She has been transition to continuous feeding at night, with bolus 

feedings during the day. Unfortunately she is tolerating the bolus feedings 

poorly, with reflux, gurgling, and abdominal discomfort. I agreed we would 

decrease the volume, and distribute more evenly through the day. She continues 

to work with the rehabilitation team to maximize functionality, but she is no 

longer able to tolerate oral intake.





Patient has had 3 falls this week, circumstances were related to getting up to 

go to the bathroom. Patient can still be continent, and dislikes sweating the 

bed. Is aware, need to minimize fall risk, which may include changing out her 

bed. In discussion with her sister, this is the last piece, that represents in 

the things from her previous normal life. But is hoping we can come up with a 

compromise.





Patient does complain of new left sided shoulder pain from fall. She does have 

adequate range of motion, no point tenderness, no observable bruising. She does 

have pain medication available, encouraged to use if needed.








Social History





- Living Situation


Living arrangement: Nursing home (Patient transitioned to SNF with 

implementation of TF; needed higher level of care)


Support System: 


Sister Chidi DPOA; oversees care plan








Medications/Allergies





- Medications


Home Medications: 


 Ambulatory Orders











 Medication  Instructions  Recorded  Confirmed


 


Aspirin [Aspir 81] 81 mg PEG DAILY 09/24/13 08/22/17


 


Losartan [Cozaar] 25 mg PEG DAILY 09/24/13 09/13/17


 


Brimonidine 0.2% Ophth Drops 1 drops EACHEYE BID 03/05/17 09/13/17





[Alphagan P 0.2% Ophth Drops]   


 


Dorzolamide 2% Ophth Drops 1 drops EACHEYE BID 03/05/17 09/13/17





[Trusopt 2% Ophth Drops]   


 


Latanoprost 0.005% Ophth Drops 1 drops EACHEYE DAILY 03/05/17 09/13/17





[Xalatan Ophth Drops]   


 


Acetaminophen 500 mg PEG Q6HR PRN 07/19/17 09/13/17


 


Bisacodyl Supp [Dulcolax Supp] 10 mg [place] DAILY PRN 07/19/17 09/13/17


 


Escitalopram Oxalate 10 mg PEG DAILY 07/19/17 09/13/17


 


HYDROcod/ACETAM 5/325 [Norco 5/325] 2.5 mg PEG Q8HR PRN 07/19/17 09/13/17


 


Loperamide HCl [Loperamide] 10 ml PEG Q6HR PRN 07/19/17 09/13/17


 


Magnesium Hydroxide [Milk of 30 ml PEG DAILY PRN 07/19/17 09/13/17





Magnesia]   


 


Multivitamin W/Minerals [Theragran 10 ml PEG DAILY 07/19/17 09/13/17





M]   


 


Omeprazole [PriLOSEC] 20 mg PEG DAILY 07/19/17 09/13/17


 


Polyethylene Glycol 3350 [Miralax] 17 gm PEG DAILY 07/19/17 09/13/17


 


guaiFENesin/DEXTROMETHORPHAN 10 ml PEG Q6HR PRN 07/19/17 09/13/17





[Robitussin Dm]   


 


Saccharomyces Boulardii [Florastor] 250 mg PEG BID 08/14/17 09/13/17


 


HYDROcod/ACETAM 5/325 [Norco 5/325] 2.5 mg PEG Q8HR PRN 09/13/17 09/13/17


 


Metformin HCl 500 mg PEG BID 09/13/17 09/13/17


 


Ondansetron [Zuplenz] 8 mg PEG Q8HR PRN 09/13/17 09/13/17














- Allergies


Allergies/Adverse Reactions: 


 Allergies











Allergy/AdvReac Type Severity Reaction Status Date / Time


 


Iodinated Contrast- Oral and Allergy Severe Respiratory Verified 06/15/17 03:16





IV Dye     





[Iodinated Contrast Media -     





IV Dye]     


 


bee pollen [Bee Pollen] Allergy  Hives Verified 06/15/17 03:16


 


morphine AdvReac Intermediate Hallucinati Verified 08/22/17 09:59





   ons  














Review of Systems





- Eyes


Eyes: reports: Vision loss





- Ears, Nose & Throat


Ears, Nose & Throat: reports: Other (dry mouth)





- Cardiovascular


Cariovascular: denies: Chest pain, Edema





- Respiratory


Respiratory: reports: Cough (much less; improved without taking in oral)





- Gastrointestinal


Gastrointestinal: reports: Constipation, Nausea (intermittent ondansetron 

helpful), Reflux/heartburn (reports "gurgling" and reflux with bolus feedings;)

.  denies: Vomiting





- Genitourinary


Genitourinary: reports: Other (patient gets stressed at thought being 

incontinent; dislikes waiting for help to BR)





- Musculoskeletal


Musculoskeletal: reports: Muscle pain, Muscle aches, Stiffness (increase pain 

in left shoulder from recent fall; right elbow pain full with ROM)





- Integumentary


Integumentary: reports: Dryness





- Neurological


Neurological: reports: General weakness, Slurred speech (speech a little more 

clear today)





- Psychiatric


Psychiatric: reports: Depression (reports doing pretty well despite stressors 

experiencing with new placement)





- Endocrine


Endocrine: reports: Other (blood sugar 138;)





- Hematologic/Lymphatic


Hematologic/Lymphatic: reports: Recurrent infections (Urine was negative; 

received 3 days of Bactrim; recurrent yeast infection reports symptoms resolved 

currently)





- All Other Systems


All Other Systems: reports: Reviewed and negative





Physical Examination





- Vital Signs


Temperature: 97.4 C


Pulse Rate: 72


Respiratory Rate: 18


Blood Pressure: 112/72





- Physical Exam


General Appearance: positive: No acute distress


Eyes Bilateral: positive: Other (difficult to focus;tipped back able to track 

for conversation)


ENT: positive: Dry mucous membranes


Neck: positive: Trachea midline, Stiff neck (tipped back)


Respiratory: positive: Other (diminished in bases; clear)


Cardiovascular: positive: Regular rate & rhythm


Abdomen: positive: Nml bowel sounds, Other (PEG exit site w/out s/s/ infection)


Skin: positive: Pallor, Other (few bruises on lower extremeties from fall noted)


Extremities: positive: Other (unable to straighten right arm without pain; 

noted contracture; left shoulder with ROM but point tenderness-patient 

attributes to injury from fall)


Neurologic/Psychiatric: positive: Disoriented to time, Slurred/abnml speech (

speech improved today)


Comments/Other: 


Denies any further burning or discomfort in vaginal area








Palliative Care





- POLST


Patient has POLST: Yes


POLST Status: DNR, Limited Interventions


Pain: Pain worsening, Location (left shoulder new pain, right shoulder arm 

chronic; somewhat stiff from falls)


Drowsiness: Mild (1-3)


Nausea: Moderate (4-6) (intermittent related to feeding)


Anxiety: None


Dyspnea: None


Insomnia: Sleeps poorly (distressed with snoring roomate; difficulty at night)


Constipation: Yes, Opoid induced, Unmanaged (reports soft but not often enought 

per her perception)


Feelings of wellbeing/Perceived Quality of Life: Worsening (Missing support 

from [facility] staff; reviewed difficult choice but reviewed how much better 

doing with cough; choking; better hydrated feeling somewhat better physically; 

but very sad and lonely)


Performance Status: 


Patient dependent on clinical staff, for assistance with transfers to wheelchair

, and dressing. She is having increased difficulty tolerating being up for 

extended periods of time. She does need to have the head of her bed up greater 

than 45 for management of her reflux, and though it is posted, I have found 

her positioned at 30 and flat in bed.








- Palliative Care


Discussion: 


Surrogate decision maker-Chidi Euceda.Patient retains her zen sense of humor

, and discussing the stressors that she continuously faces. She does express 

feelings of grief and loss, of leaving behind relationships she developed at 

[facility]. She is continuing to meet, with the  weekly, currently her 

caregiver Mary, is on vacation. Dexter, her ex-, continues to visit 

weekly as well. She does understand the seriousness of her illness, she 

continues to find her current situation acceptable as far as quality of life. 

She is aware, but she does have confusion at times, but is easily redirected.





Impression and Recommendations





- Palliative Care


Impression: 


This is a zen 72-year-old woman with PSP, recently initiated on tube feeding 

secondary to loss of swallow and high risk for aspiration, and to meet her 

nutritional needs. Continue to adjust feedings to maximize comfort, and time 

off to allow other activities. Recently with several falls, attributed to 

patient's getting up to the bathroom without assistance. She does present with 

minor injury, and concern for patient's safety. She continues to adjust to her 

new environment, but expresses feelings of ongoing grief and loss.





Recommendations/Counseling Done: 


1. Dysphasia related to PSP. Orders left to decrease his bolus feedings to 50% 

of carton, spreadout 4 times through the day. She is on continuous feedings at 

80 mL per hour at night. Wi'll continue to work with the dietitian for 

maximizing her nutritional status balance seen with impacting her quality of 

life issues. Concern even with PEG tube of aspiration risk, reiterated with 

staff and patient the need to have her in her wheelchair for bolus feedings, 

and her head of bed at 45. Followup with speech therapy, for further 

recommendations and training.





2. Fall risk. Patient able to identify specifically that she's getting up to 

the bathroom, and not waiting for assistance. In counseling and review with 

staff, plan for timed toileting was at staff list. Patient aware needs to 

cooperate and in agreement.





3. Constipation. Patient going more often, but still perceives constipation. 

Will schedule senna 8.6 mg every other day per tube and evaluate response.





4. Dysphonia secondary to PSP. Patient does have reader board, established by 

 to assist with communication. Patient needs encouragement to use it. Her 

speech is much better today, she does fluctuate from day to day.





5. Acute on chronic pain secondary to fall and left shoulder. Patient's 

baseline regimen for her chronic right shoulder pain has been effective, and 

hesitant to add to any sedation, encouraged patient to ask for medication for 

breakthrough pain if increased discomfort. Did followup with therapies 

regarding patient's new complaint





6. Advanced care planning. Counseling provided for normalizing feelings 

regarding anticipatory grief and feelings of loss related to her transition. 

She will continue with weekly  visits, volunteer to initiate support as 

well..





Time Spent: 


60 minutes with greater than 50% of this done in counseling regarding tube 

feeding support, pain management, evaluating fall risks. Coordination of care 

with rehabilitation therapies and clinical staff new orders were provided.

## 2017-09-29 ENCOUNTER — HOSPITAL ENCOUNTER (OUTPATIENT)
Dept: HOSPITAL 76 - LAB.R | Age: 72
Discharge: HOME | End: 2017-09-29
Attending: SKILLED NURSING FACILITY
Payer: MEDICARE

## 2017-09-29 DIAGNOSIS — N18.3: Primary | ICD-10-CM

## 2017-09-29 LAB
ANION GAP SERPL CALCULATED.4IONS-SCNC: 7 MMOL/L (ref 6–13)
BUN SERPL-MCNC: 23 MG/DL (ref 6–20)
CALCIUM UR-MCNC: 9.4 MG/DL (ref 8.5–10.3)
CHLORIDE SERPL-SCNC: 104 MMOL/L (ref 101–111)
CO2 SERPL-SCNC: 27 MMOL/L (ref 21–32)
CREAT SERPLBLD-SCNC: 0.7 MG/DL (ref 0.4–1)
GFRSERPLBLD MDRD-ARVRAT: 82 ML/MIN/{1.73_M2} (ref 89–?)
GLUCOSE SERPL-MCNC: 112 MG/DL (ref 70–100)
POTASSIUM SERPL-SCNC: 4.1 MMOL/L (ref 3.5–5)
SODIUM SERPLBLD-SCNC: 138 MMOL/L (ref 135–145)

## 2017-09-29 PROCEDURE — 80048 BASIC METABOLIC PNL TOTAL CA: CPT

## 2017-10-30 ENCOUNTER — HOSPITAL ENCOUNTER (OUTPATIENT)
Dept: HOSPITAL 76 - LAB.R | Age: 72
Discharge: HOME | End: 2017-10-30
Attending: SKILLED NURSING FACILITY
Payer: MEDICARE

## 2017-10-30 DIAGNOSIS — R82.90: Primary | ICD-10-CM

## 2017-10-30 LAB
CUL URINE ADD CHARGE: (no result)
PH UR STRIP.AUTO: 7 PH (ref 5–7.5)
SP GR UR STRIP.AUTO: 1.01 (ref 1–1.03)
UA CHARGE (STRIP ONLY): (no result)
UA W/ MICROSCOPIC CHARGE: YES
UR CULTURE IF IND: (no result)
UROBILINOGEN UR STRIP.AUTO-MCNC: NEGATIVE MG/DL

## 2017-10-30 PROCEDURE — 81003 URINALYSIS AUTO W/O SCOPE: CPT

## 2017-10-30 PROCEDURE — 87077 CULTURE AEROBIC IDENTIFY: CPT

## 2017-10-30 PROCEDURE — 87086 URINE CULTURE/COLONY COUNT: CPT

## 2017-10-30 PROCEDURE — 81001 URINALYSIS AUTO W/SCOPE: CPT

## 2017-11-03 ENCOUNTER — HOSPITAL ENCOUNTER (OUTPATIENT)
Dept: HOSPITAL 76 - PC | Age: 72
Discharge: HOME | End: 2017-11-03
Attending: NURSE PRACTITIONER
Payer: MEDICARE

## 2017-11-03 DIAGNOSIS — Z79.84: ICD-10-CM

## 2017-11-03 DIAGNOSIS — R13.10: ICD-10-CM

## 2017-11-03 DIAGNOSIS — G23.1: ICD-10-CM

## 2017-11-03 DIAGNOSIS — Z93.1: ICD-10-CM

## 2017-11-03 DIAGNOSIS — Z87.440: ICD-10-CM

## 2017-11-03 DIAGNOSIS — E11.9: ICD-10-CM

## 2017-11-03 DIAGNOSIS — Z51.5: Primary | ICD-10-CM

## 2017-11-03 DIAGNOSIS — N39.0: ICD-10-CM

## 2017-11-03 DIAGNOSIS — M25.512: ICD-10-CM

## 2017-11-03 DIAGNOSIS — Z79.891: ICD-10-CM

## 2017-11-03 DIAGNOSIS — Z91.81: ICD-10-CM

## 2017-11-03 DIAGNOSIS — Z99.3: ICD-10-CM

## 2017-11-03 DIAGNOSIS — Z66: ICD-10-CM

## 2017-11-03 DIAGNOSIS — M25.511: ICD-10-CM

## 2017-11-03 PROCEDURE — 99310 SBSQ NF CARE HIGH MDM 45: CPT

## 2017-11-03 NOTE — CONSULTATION NOTE
Palliative Care Follow Up





- Referral


Referring Provider: Dr. Cherrie Alas


Time of Visit: 7031-1549


Referral setting: Skilled Nursing Facility


Referral Reason: PSP





- Information Sources


Records reviewed: RN notes reviewed


History/Review of Systems obtained from: Patient


Exam limitations: Clinical condition (Patient's voice is getting more difficult 

to understand, and she does have some poor short-term memory recall 

issuesPatient's voice is getting more difficult to understand, and she does 

have some poor short-term memory recall issues)





- History of Present Illness Update


Brief HPI Update: 


This is a zen 72-year-old woman with progressive supranuclear palsy, she is 

currently supported at the nursing home.  She does have a PEG tube placed for 

disease progression that included choking, dysphasia and ongoing weight loss.  

She has been tolerating her new feeding schedule every 4 hours, remained weight 

neutral, and has had no further respiratory distress or worse diagnosis is of 

pneumonia.  She most recently had yet another UTI, I did check her bladder with 

a bladder scan, she does not have any symptoms currently of urinary retention 

her total volume was 138 mils.  She does perceive herself is getting weaker, 

her vision worsening, and less tolerance for activity.  She does still enjoy 

her current quality of life, has many friends and family that visit her on a 

regular basis, and does enjoy activities at the nursing home.





I do find patient with concerns relating to end of life, some increased 

bilateral shoulder pain, but doing fairly well overall.








Social History





- Living Situation


Living arrangement: Nursing home (Patient had previously been at [facility], 

because of her tube feedings and increased level of care is now a resident at 

the nursing home.)


Support System: 


Her sister Chidi oversees her care plan, she visits frequently as well as 

other family members.  She does have a paid caregiver Teo but does still take 

her out for manicures, rides, and to supplement her interaction. She is seen 

weekly by the Palliative Care .








Medications/Allergies





- Medications


Home Medications: 


 Ambulatory Orders











 Medication  Instructions  Recorded  Confirmed


 


Aspirin [Aspir 81] 81 mg PEG DAILY 09/24/13 11/05/17


 


Losartan [Cozaar] 25 mg PEG DAILY 09/24/13 11/05/17


 


Brimonidine 0.2% Ophth Drops 1 drops EACHEYE BID 03/05/17 11/05/17





[Alphagan P 0.2% Ophth Drops]   


 


Dorzolamide 2% Ophth Drops 1 drops EACHEYE BID 03/05/17 11/05/17





[Trusopt 2% Ophth Drops]   


 


Latanoprost 0.005% Ophth Drops 1 drops EACHEYE DAILY 03/05/17 11/05/17





[Xalatan Ophth Drops]   


 


Acetaminophen 500 mg PEG Q6HR PRN 07/19/17 11/05/17


 


Bisacodyl Supp [Dulcolax Supp] 10 mg [place] DAILY PRN 07/19/17 11/05/17


 


Escitalopram Oxalate 10 mg PEG DAILY 07/19/17 11/05/17


 


HYDROcod/ACETAM 5/325 [Norco 5/325] 2.5 mg PEG Q8HR PRN 07/19/17 11/05/17


 


Loperamide HCl [Loperamide] 10 ml PEG Q6HR PRN 07/19/17 11/05/17


 


Magnesium Hydroxide [Milk of 30 ml PEG DAILY PRN 07/19/17 11/05/17





Magnesia]   


 


Multivitamin W/Minerals [Theragran 10 ml PEG DAILY 07/19/17 11/05/17





M]   


 


Omeprazole [PriLOSEC] 20 mg PEG BID 07/19/17 11/05/17


 


Polyethylene Glycol 3350 [Miralax] 17 gm PEG DAILY 07/19/17 11/05/17


 


guaiFENesin/DEXTROMETHORPHAN 10 ml PEG Q6HR PRN 07/19/17 11/05/17





[Robitussin Dm]   


 


HYDROcod/ACETAM 5/325 [Norco 5/325] 2.5 mg PEG Q8HR PRN 09/13/17 11/05/17


 


Metformin HCl 500 mg PEG BID 09/13/17 11/05/17


 


Ondansetron [Zuplenz] 8 mg PEG Q8HR PRN 09/13/17 11/05/17


 


Cephalexin [Keflex] 250 mg PEG TID 11/05/17 11/05/17


 


Senna [Senokot] 8.6 mg PEG DAILY 11/05/17 11/05/17














- Allergies


Allergies/Adverse Reactions: 


 Allergies











Allergy/AdvReac Type Severity Reaction Status Date / Time


 


Iodinated Contrast- Oral and Allergy Severe Respiratory Verified 06/15/17 03:16





IV Dye     





[Iodinated Contrast Media -     





IV Dye]     


 


bee pollen [Bee Pollen] Allergy  Hives Verified 06/15/17 03:16


 


morphine AdvReac Intermediate Hallucinati Verified 08/22/17 09:59





   ons  














Review of Systems





- Constitutional


Constitutional: reports: Fatigue, Weakness (per patient report notes overall 

increasing weakness, not just with infection, feeling somewhat better on 

antibiotics), Weight stable (156.5)





- Eyes


Eyes: reports: Vision loss (reports is worsening)





- Ears, Nose & Throat


Ears, Nose & Throat: reports: Other (neck permenantly flexed per her report; 

unable to straighten at all)





- Cardiovascular


Cardiovascular: denies: Chest pain, Lightheadedness





- Respiratory


Respiratory: denies: Cough





- Gastrointestinal


Gastrointestinal: reports: Other (occasionally feels hungery; TF currently 

every four hours is tolerating this well also does not interfer with night time 

sleeping as infusion was.).  denies: Constipation, Reflux/heartburn





- Genitourinary


Genitourinary: reports: Incontinence (at times).  denies: Dysuria, Frequency





- Musculoskeletal


Musculoskeletal: reports: Limited range of motion (neck), Muscle weakness, 

Joint pain (reports increased shoulder pain/neck pain), Other (mostly 

wheelchair bound though can walk a few steps and stand and pivot; her biggest 

issue is balance with not being able to focus and postiioning of neck/head)





- Integumentary


Integumentary: reports: Dryness





- Neurological


Neurological: reports: Memory problems (admits to [facility]), Slurred speech (more 

difficult to understand)





- Psychiatric


Psychiatric: denies: Depression, Anxiety, Hallucinations





- Endocrine


Endocrine: reports: Diabetes type 2 (blood sugars running good in 90's; could 

consider decreasing or stopping metformin)





- Hematologic/Lymphatic


Hematologic/Lymphatic: reports: Recurrent infections (UTIs')





- All Other Systems


All Other Systems: reports: Reviewed and negative





Physical Exam





- Vital Signs


Temperature: 98.0 C


Pulse Rate: 74


Respiratory Rate: 18


O2 Saturation: 97 (ra @ rest)


Blood Pressure: 102/64





- Physical Exam


General Appearance: positive: No acute distress


Eyes Bilateral: positive: No lid inflammation, Conjunctivae nml


ENT: positive: Dry mucous membranes (still having oral care issues; reports 

some improved; no s/s candidiasis today though at high risk now with AB)


Neck: positive: Stiff neck (hyperextended back; unable to straighten at all)


Cardiovascular: positive: Regular rate & rhythm


Respiratory: positive: Breath sounds nml, Diminished in bases


Abdomen: positive: Non-tender, Soft, Other (bladder scan done to r/o retention 

138 mls)


Skin: positive: Pallor


Extremities: positive: Other (patient in bed; able to move all extremities 

though shoulder RoM limited and unable to lift independently overhead)


Neurologic/Psychiatric: positive: Oriented x3, Mood/affect nml, Slurred/abnml 

speech





Palliative Care





- POLST


Patient has POLST: Yes


POLST Status: DNR, Limited Interventions


Pain: Pain worsening, Location (bilaterally in shoulders; on small doses of 

hydrocodone/apap 2.5 mg TID;)


Tiredness/Fatigue: Moderate (4-6)


Drowsiness/Sedation: Mild (1-3)


Nausea: None


Depression: None


Anxiety: Moderate (4-6), Comment (see PC discussion)


Dyspnea: None


Sleep: Sleep improved (with q4 TF falls back to sleep easily; feels less 

distressed with schedule)


Constipation: Yes, Opoid induced, Managed


Feelings of wellbeing/Perceived Quality of Life: Fair, Acceptable


Performance Status: 


Patient dependent for all ADLs, facility bathe surgery twice a week.  Paid 

caregiver comes in and assist with some grooming as well as taking her out.  

She is "portable" in her wheelchair.  She does enjoy interaction and being part 

of activities even if she cannot participate.








- Palliative Care


Discussion: 


When asked patient what she worries about most, she did whisper "passing".  I 

asked her why this had come to her mind, she reports is because of the 

increased weakness.  She does still perceives her quality of life is quite well

, but she is somewhat fearful of her end.  Things that were of concern for her 

work pain, not been able to breathe, and distress to her sister.  I reassured 

her currently her weakness could be attributed to her UTI, that yes we do 

expect her disease to continue, but her weight has remained stable, she has had 

no pneumonias, and her biggest risk continues to be the sequela of her falls.  

Also discussed and reassured her at end of life, we have the PEG tube that we 

would use for comfort medications, would be able to keep her comfortable, we 

give her medications so she was sleeping and not in distress with her breathing

, and could stop the tube feedings at that point in time so her dying process 

was not prolonged.  This seemed to address her current concerns.  I also used 

this opportunity to reinforce her need to call for help because if she did have 

a fall this would certainly hasten her decline.  She is able to tell me she 

still enjoys visiting, her family, and she is not ready to give it up yet.








Impression and Recommendations





- Palliative Care


Impression: 


This is a 72-year-old woman who is quite delightful, who does have PSP.  She 

has moderate to high symptom burden, continues to enjoy her current quality of 

life, she does present with some increased weakness and now recurrent UTI.  Her 

goals are to continue to focus on quality of life and quantity of life as long 

as she is enjoying her quality.





Recommendations/Counseling Done: 


1.  PSP.  Patient does continue with some muscle wasting, despite tube feedings 

support.  She is having increased visual loss, her voice is weaker, her 

breathlessness more noticeable to her.  She currently identifies her quality of 

life is acceptable, her biggest risk of course is the sequela from a fall.  

Reviewed this with patient.  





2.  UTI.  Patient been started on Keflex per PCP in response to positive urine.

  CNS comes back with appropriate antibiotic.  Patient does have history of 

both vaginal and oral candidiasis, currently with no signs or symptoms but will 

continue to monitor.


3.  Bilateral shoulder pain.  Discussed with patient regarding titrating pain 

medication up, she does want to hold off given her concern for sedation and 

further weakness.  Will revisit again at next visit after she has completed 

antibiotics.


4.  Dysphagia.  She has had no remembrance of recent choking episodes.  She is 

managing her oral secretions without difficulty currently.  She is remaining 

weight neutral and new tube feeding schedule is working with improved quality 

sleep.


5.  Advanced care planning.  Adjust patient's concerns and feelings regarding 

her pending decline, counseling and anticipatory guidance provided








Time Spent: 


45 minutes with greater than 50% of this done in counseling with patient 

regarding concerns around end of life, anticipatory guidance provided and 

address depressive feelings.  Coordination of care with staff regarding 

medications and care needs.

## 2017-11-09 ENCOUNTER — HOSPITAL ENCOUNTER (OUTPATIENT)
Dept: HOSPITAL 76 - PC | Age: 72
Discharge: HOME | End: 2017-11-09
Attending: NURSE PRACTITIONER
Payer: MEDICARE

## 2017-11-09 ENCOUNTER — HOSPITAL ENCOUNTER (OUTPATIENT)
Dept: HOSPITAL 76 - LAB.R | Age: 72
Discharge: HOME | End: 2017-11-09
Attending: SKILLED NURSING FACILITY
Payer: MEDICARE

## 2017-11-09 DIAGNOSIS — Z93.1: ICD-10-CM

## 2017-11-09 DIAGNOSIS — Z51.5: Primary | ICD-10-CM

## 2017-11-09 DIAGNOSIS — R31.29: Primary | ICD-10-CM

## 2017-11-09 DIAGNOSIS — G23.1: ICD-10-CM

## 2017-11-09 DIAGNOSIS — R31.0: ICD-10-CM

## 2017-11-09 DIAGNOSIS — B37.3: ICD-10-CM

## 2017-11-09 DIAGNOSIS — Z87.440: ICD-10-CM

## 2017-11-09 DIAGNOSIS — Z66: ICD-10-CM

## 2017-11-09 DIAGNOSIS — N39.0: ICD-10-CM

## 2017-11-09 LAB
CUL URINE ADD CHARGE: (no result)
PH UR STRIP.AUTO: 6 PH (ref 5–7.5)
SP GR UR STRIP.AUTO: 1.02 (ref 1–1.03)
UA CHARGE (STRIP ONLY): (no result)
UA W/ MICROSCOPIC CHARGE: YES
UR CULTURE IF IND: (no result)
UROBILINOGEN UR STRIP.AUTO-MCNC: NEGATIVE MG/DL
WBC # UR MANUAL: >25 /HPF (ref 0–5)

## 2017-11-09 PROCEDURE — 81003 URINALYSIS AUTO W/O SCOPE: CPT

## 2017-11-09 PROCEDURE — 81001 URINALYSIS AUTO W/SCOPE: CPT

## 2017-11-09 PROCEDURE — 87086 URINE CULTURE/COLONY COUNT: CPT

## 2017-11-09 PROCEDURE — 99309 SBSQ NF CARE MODERATE MDM 30: CPT

## 2017-11-09 NOTE — CONSULTATION NOTE
Palliative Care Follow Up





- Referral


Referring Provider: Dr. Cherrie Alas


Time of Visit: 3945-8577


Referral setting: Skilled Nursing Facility


Referral Reason: Recurrent UTI symptoms





- Information Sources


Records reviewed: RN notes reviewed


History/Review of Systems obtained from: Patient


Exam limitations: Clinical condition (patient with difficult communication 

related to ongoing decline; STM issues at times)





- History of Present Illness Update


Brief HPI Update: 


This is a zen 72-year-old woman with progressive supranuclear palsy, she is 

currently supported at a nursing home.  She does have a PEG tube placed for 

tube feedings and nutritional support secondary to symptoms of choking, dyspnea 

aphasia, and to maintain weight.  She currently is on tube feedings every 4 

hours, with boluses does appear to be tolerating this.  She is recently treated 

with Keflex for UTI, unfortunately 2 days after she is still having urinary 

symptoms, and obtained another UA for CNS though Keflex was on the sensitivity.

  She does have a history of multiple UTIs, it is not unusual we are treating 

her at least every 1-2 months.  Concern from nursing staff because of her pain 

and burning as well as dark brown bloody urine this a.m.  Did have them obtain 

another UA still had significant bacteria and occult blood.  Patient also known 

to have recurrent candidiasis so I am here for vaginal exam.





On exam patient does have bright red inflamed labia, though no lesions or signs 

or symptoms of bleeding as the etiology of occult blood in urine.  Significant 

thick white vaginal secretions.  Patient still complains of feeling somewhat 

fluids, though she has been afebrile and her vital signs have been stable.








Social History





- Living Situation


Living arrangement: Nursing home


Support System: 


Has paid CG checks on anastasia Bruce; Sister Chidi over sees care








Medications/Allergies





- Medications


Home Medications: 


 Ambulatory Orders











 Medication  Instructions  Recorded  Confirmed


 


Aspirin [Aspir 81] 81 mg PEG DAILY 09/24/13 11/05/17


 


Losartan [Cozaar] 25 mg PEG DAILY 09/24/13 11/05/17


 


Brimonidine 0.2% Ophth Drops 1 drops EACHEYE BID 03/05/17 11/05/17





[Alphagan P 0.2% Ophth Drops]   


 


Dorzolamide 2% Ophth Drops 1 drops EACHEYE BID 03/05/17 11/05/17





[Trusopt 2% Ophth Drops]   


 


Latanoprost 0.005% Ophth Drops 1 drops EACHEYE DAILY 03/05/17 11/05/17





[Xalatan Ophth Drops]   


 


Acetaminophen 500 mg PEG Q6HR PRN 07/19/17 11/05/17


 


Bisacodyl Supp [Dulcolax Supp] 10 mg [place] DAILY PRN 07/19/17 11/05/17


 


Escitalopram Oxalate 10 mg PEG DAILY 07/19/17 11/05/17


 


HYDROcod/ACETAM 5/325 [Norco 5/325] 2.5 mg PEG Q8HR PRN 07/19/17 11/05/17


 


Loperamide HCl [Loperamide] 10 ml PEG Q6HR PRN 07/19/17 11/05/17


 


Magnesium Hydroxide [Milk of 30 ml PEG DAILY PRN 07/19/17 11/05/17





Magnesia]   


 


Multivitamin W/Minerals [Theragran 10 ml PEG DAILY 07/19/17 11/05/17





M]   


 


Omeprazole [PriLOSEC] 20 mg PEG BID 07/19/17 11/05/17


 


Polyethylene Glycol 3350 [Miralax] 17 gm PEG DAILY 07/19/17 11/05/17


 


guaiFENesin/DEXTROMETHORPHAN 10 ml PEG Q6HR PRN 07/19/17 11/05/17





[Robitussin Dm]   


 


HYDROcod/ACETAM 5/325 [Norco 5/325] 2.5 mg PEG Q8HR PRN 09/13/17 11/05/17


 


Metformin HCl 500 mg PEG BID 09/13/17 11/05/17


 


Ondansetron [Zuplenz] 8 mg PEG Q8HR PRN 09/13/17 11/05/17


 


Senna [Senokot] 8.6 mg PEG DAILY 11/05/17 11/05/17


 


Fluconazole [Diflucan] 100 mg PEG DAILY 11/09/17 11/09/17


 


Saccharomyces Boulardii [Florastor] 1 cap PEG BID 11/09/17 11/09/17


 


Sulfamethox/Trimeth 800/160 1 tab PEG BID 11/09/17 11/09/17





[Bactrim Ds]   














- Allergies


Allergies/Adverse Reactions: 


 Allergies











Allergy/AdvReac Type Severity Reaction Status Date / Time


 


Iodinated Contrast- Oral and Allergy Severe Respiratory Verified 06/15/17 03:16





IV Dye     





[Iodinated Contrast Media -     





IV Dye]     


 


bee pollen [Bee Pollen] Allergy  Hives Verified 06/15/17 03:16


 


morphine AdvReac Intermediate Hallucinati Verified 08/22/17 09:59





   ons  














Review of Systems





- Constitutional


Constitutional: reports: Fatigue, Weight stable





- Eyes


Eyes: reports: Vision loss





- Ears, Nose & Throat


Ears, Nose & Throat: reports: Dry mouth





- Cardiovascular


Cardiovascular: reports: Decr. exercise tolerance





- Respiratory


Respiratory: reports: Cough (during visit; reports still better and not 

frequently)





- Gastrointestinal


Gastrointestinal: reports: Reflux/heartburn, Other (TF every 4 hours now)





- Genitourinary


Genitourinary: reports: Dysuria, Incontinence





- Musculoskeletal


Musculoskeletal: reports: Limited range of motion (bilateral shoulder)





- Integumentary


Integumentary: reports: Dryness





- Neurological


Neurological: reports: General weakness, Memory problems, Slurred speech





- Psychiatric


Psychiatric: denies: Depression, Anxiety, Behavior disturbances





- Endocrine


Endocrine: reports: Diabetes type 2





- Hematologic/Lymphatic


Hematologic/Lymphatic: reports: Recurrent infections (Just treated and finished 

Keflex; treated almost consistently with AB)





- All Other Systems


All Other Systems: reports: Reviewed and negative





Physical Exam





- Vital Signs


Temperature: 98.3 C


Pulse Rate: 64


Respiratory Rate: 18


O2 Saturation: 95


Blood Pressure: 112/64





- Physical Exam


General Appearance: positive: No acute distress


Eyes Bilateral: positive: No lid inflammation, Conjunctivae nml


ENT: positive: Dry mucous membranes


Neck: positive: Trachea midline, Stiff neck


Cardiovascular: positive: Regular rate & rhythm


Respiratory: positive: Diminished in bases (right greater than left; cough with 

poor expiratory force/choking motion during TF at visit; but trying to talk 

during this time)


Abdomen: positive: Non-tender, Soft, Nml bowel sounds, Other (vaginal exam; 

thick white cheesy discharge; red inflammed labia folds; bladder scan 40 mls)


Skin: positive: Pallor


Extremities: positive: No pedal edema


Neurologic/Psychiatric: positive: Oriented x3, Mood/affect nml, Slurred/abnml 

speech





Palliative Care





- POLST


Patient has POLST: Yes


POLST Status: DNR, Limited Interventions


Pain: Pain unchanged, Location (bilateral shoulder pain), Comment (severe 

dysuria; burning in vaginal area though denies "itching")


Tiredness/Fatigue: Moderate (4-6)


Drowsiness/Sedation: Mild (1-3)





Results





- Lab Results


Lab results reviewed: Yes





Impression and Recommendations





- Palliative Care


Impression: 


This is a 72-year-old woman who has progressive supranuclear palsy, currently 

now with recalcitrant UTI.  She is symptomatic from this, as well as a vaginal 

candidiasis.  UA for C&S obtained, will rotate antibiotics and treat her 

vaginal candidiasis.  Her goals continue to be focus on quality of life.





Recommendations/Counseling Done: 


1.  Recurrent UTI.  Did order Bactrim DS 1 tab twice daily for 10 days, she did 

get a good response for this last time in September she was treated.  Her last 

culture and sensitivity did show this sensitive. Did order Florastor 250 mg BID 

for 14 days as well, she is at high risk for c. diff given multiple courses of 

AB


2.  Vaginal candidiasis.  Diflucan 100 mg 3 days ordered, and weekly 3 weeks, 

with hopes to manage her recurrent symptoms.








Time Spent: 


30 minutes with greater than 50% of this done in counseling and coordination of 

care with staff regarding performing vaginal exam, bladder scan, follow-up with 

her sister voicemail left request call back if any questions.

## 2017-11-28 ENCOUNTER — HOSPITAL ENCOUNTER (OUTPATIENT)
Dept: HOSPITAL 76 - PC | Age: 72
Discharge: HOME | End: 2017-11-28
Attending: NURSE PRACTITIONER
Payer: MEDICARE

## 2017-11-28 ENCOUNTER — HOSPITAL ENCOUNTER (OUTPATIENT)
Dept: HOSPITAL 76 - EMS | Age: 72
Discharge: ANOTHER HOSPITAL | End: 2017-11-28
Attending: SURGERY
Payer: MEDICARE

## 2017-11-28 ENCOUNTER — HOSPITAL ENCOUNTER (EMERGENCY)
Dept: HOSPITAL 76 - ED | Age: 72
Discharge: HOME | End: 2017-11-28
Payer: MEDICARE

## 2017-11-28 ENCOUNTER — HOSPITAL ENCOUNTER (OUTPATIENT)
Dept: HOSPITAL 76 - EMS | Age: 72
Discharge: NURSING, REHAB OR LONG-TERM CARE | End: 2017-11-28
Attending: SURGERY
Payer: MEDICARE

## 2017-11-28 VITALS — SYSTOLIC BLOOD PRESSURE: 136 MMHG | DIASTOLIC BLOOD PRESSURE: 77 MMHG

## 2017-11-28 DIAGNOSIS — E78.00: ICD-10-CM

## 2017-11-28 DIAGNOSIS — E11.8: ICD-10-CM

## 2017-11-28 DIAGNOSIS — Z74.01: ICD-10-CM

## 2017-11-28 DIAGNOSIS — R19.00: ICD-10-CM

## 2017-11-28 DIAGNOSIS — Z91.81: ICD-10-CM

## 2017-11-28 DIAGNOSIS — E11.9: ICD-10-CM

## 2017-11-28 DIAGNOSIS — G23.1: ICD-10-CM

## 2017-11-28 DIAGNOSIS — Z95.2: ICD-10-CM

## 2017-11-28 DIAGNOSIS — R05: Primary | ICD-10-CM

## 2017-11-28 DIAGNOSIS — Z51.5: Primary | ICD-10-CM

## 2017-11-28 DIAGNOSIS — Z79.82: ICD-10-CM

## 2017-11-28 DIAGNOSIS — Z93.1: ICD-10-CM

## 2017-11-28 DIAGNOSIS — Z79.84: ICD-10-CM

## 2017-11-28 DIAGNOSIS — Z74.01: Primary | ICD-10-CM

## 2017-11-28 DIAGNOSIS — I10: ICD-10-CM
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
## 2017-11-28 DIAGNOSIS — R09.89: ICD-10-CM

## 2017-11-28 DIAGNOSIS — T17.908A: Primary | ICD-10-CM

## 2017-11-28 DIAGNOSIS — Z66: ICD-10-CM

## 2017-11-28 PROCEDURE — 99284 EMERGENCY DEPT VISIT MOD MDM: CPT

## 2017-11-28 PROCEDURE — 99283 EMERGENCY DEPT VISIT LOW MDM: CPT

## 2017-11-28 PROCEDURE — 99310 SBSQ NF CARE HIGH MDM 45: CPT

## 2017-11-28 PROCEDURE — 71010: CPT

## 2017-11-28 NOTE — XRAY PRELIMINARY REPORT
Accession: [id number]

Exam: XR CHEST 1 VIEW

 

IMPRESSION: No plain radiographic evidence of aspiration.

 

RADIA

 

SITE ID: [id number]

## 2017-11-28 NOTE — ED PHYSICIAN DOCUMENTATION
PD HPI DYSPNEA





- Stated complaint


Stated Complaint: COUGH





- Chief complaint


Chief Complaint: Resp





- History obtained from


History obtained from: Family (sister)





- History of Present Illness


Timing - onset: Other (73yo woman with progressive Supranuclear palsy, She has 

been tube fed for the last few months.  She was brought in from the nursing 

home today for concern for aspiration.  She recently switched from continuous 

to bolus feedings and had some coffee via the PEG tube on Thanksgiving.  She 

has had a cough but not sure of the acuity of this.)





Review of Systems


Unable to obtain: Confused





PD PAST MEDICAL HISTORY





- Past Medical History


Cardiovascular: Hypertension, High cholesterol, Valve disorder


Respiratory: Other


Neuro: Dementia


Endocrine/Autoimmune: Type 2 diabetes


GI: GERD


: Incontinence


HEENT: None


Psych: None


Musculoskeletal: Other


Derm: None


Other Past Medical History: Progressive pseudobubilar palsy





- Past Surgical History


Past Surgical History: Yes


General: Appendectomy


/GYN: Hysterectomy


Cardiovascular: Valve replacement





- Present Medications


Home Medications: 


 Ambulatory Orders











 Medication  Instructions  Recorded  Confirmed


 


Aspirin [Aspir 81] 81 mg PEG DAILY 09/24/13 11/05/17


 


Losartan [Cozaar] 25 mg PEG DAILY 09/24/13 11/05/17


 


Brimonidine 0.2% Ophth Drops 1 drops EACHEYE BID 03/05/17 11/05/17





[Alphagan P 0.2% Ophth Drops]   


 


Dorzolamide 2% Ophth Drops 1 drops EACHEYE BID 03/05/17 11/05/17





[Trusopt 2% Ophth Drops]   


 


Latanoprost 0.005% Ophth Drops 1 drops EACHEYE DAILY 03/05/17 11/05/17





[Xalatan Ophth Drops]   


 


Acetaminophen 500 mg PEG Q6HR PRN 07/19/17 11/05/17


 


Bisacodyl Supp [Dulcolax Supp] 10 mg [place] DAILY PRN 07/19/17 11/05/17


 


Escitalopram Oxalate 10 mg PEG DAILY 07/19/17 11/05/17


 


HYDROcod/ACETAM 5/325 [Norco 5/325] 2.5 mg PEG Q8HR PRN 07/19/17 11/05/17


 


Loperamide HCl [Loperamide] 10 ml PEG Q6HR PRN 07/19/17 11/05/17


 


Magnesium Hydroxide [Milk of 30 ml PEG DAILY PRN 07/19/17 11/05/17





Magnesia]   


 


Multivitamin W/Minerals [Theragran 10 ml PEG DAILY 07/19/17 11/05/17





M]   


 


Omeprazole [PriLOSEC] 20 mg PEG BID 07/19/17 11/05/17


 


Polyethylene Glycol 3350 [Miralax] 17 gm PEG DAILY 07/19/17 11/05/17


 


guaiFENesin/DEXTROMETHORPHAN 10 ml PEG Q6HR PRN 07/19/17 11/05/17





[Robitussin Dm]   


 


HYDROcod/ACETAM 5/325 [Norco 5/325] 2.5 mg PEG Q8HR PRN 09/13/17 11/05/17


 


Metformin HCl 500 mg PEG BID 09/13/17 11/05/17


 


Ondansetron [Zuplenz] 8 mg PEG Q8HR PRN 09/13/17 11/05/17


 


Senna [Senokot] 8.6 mg PEG DAILY 11/05/17 11/05/17


 


Fluconazole [Diflucan] 100 mg PEG DAILY 11/09/17 11/09/17


 


Saccharomyces Boulardii [Florastor] 1 cap PEG BID 11/09/17 11/09/17


 


Sulfamethox/Trimeth 800/160 1 tab PEG BID 11/09/17 11/09/17





[Bactrim Ds]   














- Allergies


Allergies/Adverse Reactions: 


 Allergies











Allergy/AdvReac Type Severity Reaction Status Date / Time


 


Iodinated Contrast- Oral and Allergy Severe Respiratory Verified 11/28/17 12:35





IV Dye     





[Iodinated Contrast Media -     





IV Dye]     


 


bee pollen [Bee Pollen] Allergy  Hives Verified 11/28/17 12:35


 


morphine AdvReac Intermediate Hallucinati Verified 11/28/17 12:35





   ons  














- Social History


Does the pt smoke?: No


Smoking Status: Never smoker


Does the pt drink ETOH?: No


Does the pt have substance abuse?: No





- Immunizations


Immunizations are current?: Yes





- POLST


Patient has POLST: Yes





PD ED PE NORMAL





- Vitals


Vital signs reviewed: Yes





- General


General: Other (She is alert and pleasant, she follows simple commands and 

answers simple questions, but does seem confused and very slow to answer 

questions.)





- HEENT


HEENT: PERRL





- Neck


Neck: Supple, no meningeal sign, No bony TTP





- Cardiac


Cardiac: RRR, No murmur





- Respiratory


Respiratory: No respiratory distress, Other (mild crackles L side)





- Abdomen


Abdomen: Non tender





- Extremities


Extremities: No edema, No calf tenderness / cord





Results





- Vitals


Vitals: 


 Vital Signs - 24 hr











  11/28/17 11/28/17





  12:31 13:26


 


Temperature 36.5 C 36.6 C


 


Heart Rate 73 66


 


Respiratory 16 15





Rate  


 


Blood Pressure 142/96 H 136/77 H


 


O2 Saturation 98 96








 Oxygen











O2 Source                      Room air

















- Rads (name of study)


  ** 1v chest


Radiology: EMP read contemporaneously (NAD)





PD MEDICAL DECISION MAKING





- ED course


ED course: 





72-year-old woman with progressive supranuclear palsy sent in from nursing home 

for concern for aspiration, her chest x-ray is clear and she is breathing 

comfortably.  Case discussed by phone with Zenaida Padilla who will touch base and 

may adjust her feedings back to a continuous feed from bolus.  She also has 

specific concerns about potentially a lower abdominal hernia, this was examined 

prior to discharge, she was nontender and I did not appreciate really any mass.





Departure





- Departure


Disposition: 01 Home, Self Care


Clinical Impression: 


Aspiration into airway


Qualifiers:


 Encounter type: initial encounter Qualified Code(s): T17.908A - Unspecified 

foreign body in respiratory tract, part unspecified causing other injury, 

initial encounter





Clinical Impression: 


 (Ruled Out): Pneumonia


Condition: Good


Record reviewed to determine appropriate education?: Yes


Comments: 


Zenaida will touch base with you and may adjust her feeding. 


Return for shortness of breath or fevers.





Your blood pressure was elevated today on check into the emergency department.  

This does not mean that you have hypertension, it is a common phenomenon to 

come to the emergency department and have elevated blood pressure.  I recommend 

that you see your primary care physician within the week to have it rechecked 

when you are feeling better.

## 2017-11-28 NOTE — CONSULTATION NOTE
Palliative Care Follow Up





- Referral


Referring Provider: Dr. Cherrie Alas


Time of Visit: 10:00-10:45


Referral setting: Skilled Nursing Facility


Referral Reason: Abdominal Mass/PSP





- Information Sources


Records reviewed: RN notes reviewed, Previous records reviewed


History/Review of Systems obtained from: Patient, Family (spoke with Chidi Euceda), Caregiver (staff report)


Exam limitations: Clinical condition (Patient with very soft voice, more 

difficulty understanding)





- History of Present Illness Update


Brief HPI Update: 


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 

she is currently supported at a nursing home.  I got contacted because of 

concern of a right lower abdominal mass, it is soft, nontender and does not 

seem to be causing her any trouble.  With staff were concerned as a head showed 

up in the last couple weeks.  On arrival found the patient with choking episode

, patient does not have a cough reflex related to her disease process, she has 

loud squeaky expiratory /expulsive breathing to clear her secretions.  Staff 

have reported increased choking and coughing over the last couple weeks 

particularly in conjunction with tube feedings,And more so over the last few 

days.  She does admit to having coffee orally over Thanksgiving.  Patient's 

coughing episode lasted about 50% of the visit.  She does get somewhat 

distressed with this.  I talked herSister but her observation is been that she 

has been choking more on her oral secretions, up to this point in time she had 

been managed on those without difficulty.  Patient on bolus feedings related to 

patient preference and quality of life.  As she does like the continuous 

feeding at night, would often pull out the tube both purposely and on accident, 

and found it limiting as far as her ability to sleep and to participate in 

activities.  


She does have crackles in her left upper lobe her bases are diminished but 

clear.  She is afebrile and not tachycardic.  But given her increased choking 

more difficulty with tube feedings and concern for aspiration in the fact that 

it has been more acute the last 2-3 days.  Also concerned about that tube 

possibly migrating given when patient coughs she has a lot of abdominal pressure

, this does demonstrates a large orange size mass in the stomach wall.  When 

she is relaxed that soft no solid mass palpated underneath, and nontender to 

palpation.  Suspect she is developed abdominal wall hernia.  Did check residual

, patient has not had any tube feeding of her several hours no residual in the 

tube.  Given patient's goals of care, she still perceives herself as having 

quality of life, would want aspiration pneumonia treated her identified.  

Patient is sent to the ED and response for obtaining chest x-ray, and follow-up 

on concerns regarding PEG tube migration and abdominal hernia.








Social History





- Living Situation


Living arrangement: Nursing home


Support System: 


Sister Chidi oversees patient's care, and she is willing to come down and 

advocate for her the ED.  Patient is quite difficult to understand.  She is 

seen some decline as far as patient's ability manage secretions, some decline 

in functional status, and is I am concerned regarding what may be ahead.








Medications/Allergies





- Medications


Home Medications: 


 Ambulatory Orders











 Medication  Instructions  Recorded  Confirmed


 


Aspirin [Aspir 81] 81 mg PEG DAILY 09/24/13 11/05/17


 


Losartan [Cozaar] 25 mg PEG DAILY 09/24/13 11/05/17


 


Brimonidine 0.2% Ophth Drops 1 drops EACHEYE BID 03/05/17 11/05/17





[Alphagan P 0.2% Ophth Drops]   


 


Dorzolamide 2% Ophth Drops 1 drops EACHEYE BID 03/05/17 11/05/17





[Trusopt 2% Ophth Drops]   


 


Latanoprost 0.005% Ophth Drops 1 drops EACHEYE DAILY 03/05/17 11/05/17





[Xalatan Ophth Drops]   


 


Acetaminophen 500 mg PEG Q6HR PRN 07/19/17 11/05/17


 


Bisacodyl Supp [Dulcolax Supp] 10 mg [place] DAILY PRN 07/19/17 11/05/17


 


Escitalopram Oxalate 10 mg PEG DAILY 07/19/17 11/05/17


 


HYDROcod/ACETAM 5/325 [Norco 5/325] 2.5 mg PEG Q8HR PRN 07/19/17 11/05/17


 


Loperamide HCl [Loperamide] 10 ml PEG Q6HR PRN 07/19/17 11/05/17


 


Magnesium Hydroxide [Milk of 30 ml PEG DAILY PRN 07/19/17 11/05/17





Magnesia]   


 


Multivitamin W/Minerals [Theragran 10 ml PEG DAILY 07/19/17 11/05/17





M]   


 


Polyethylene Glycol 3350 [Miralax] 17 gm PEG DAILY 07/19/17 11/05/17


 


guaiFENesin/DEXTROMETHORPHAN 10 ml PEG Q6HR PRN 07/19/17 11/05/17





[Robitussin Dm]   


 


HYDROcod/ACETAM 5/325 [Norco 5/325] 2.5 mg PEG Q8HR PRN 09/13/17 11/05/17


 


Metformin HCl 500 mg PEG BID 09/13/17 11/05/17


 


Ondansetron [Zuplenz] 8 mg PEG Q8HR PRN 09/13/17 11/05/17


 


Senna [Senokot] 8.6 mg PEG DAILY 11/05/17 11/05/17


 


raNITIdine [Zantac] 150 mg PEG BID 11/29/17 11/29/17














- Allergies


Allergies/Adverse Reactions: 


 Allergies











Allergy/AdvReac Type Severity Reaction Status Date / Time


 


Iodinated Contrast- Oral and Allergy Severe Respiratory Verified 11/28/17 12:35





IV Dye     





[Iodinated Contrast Media -     





IV Dye]     


 


bee pollen [Bee Pollen] Allergy  Hives Verified 11/28/17 12:35


 


morphine AdvReac Intermediate Hallucinati Verified 11/28/17 12:35





   ons  














Review of Systems





- Constitutional


Constitutional: reports: Weight stable (avg aroung 156)





- Eyes


Eyes: reports: Vision loss, Corrective lenses, Other (requires many drops 

secondary to glaucoma/eye changes)





- Ears, Nose & Throat


Ears, Nose & Throat: reports: Dry mouth





- Cardiovascular


Cardiovascular: denies: Chest pain





- Respiratory


Respiratory: reports: Cough, SOB with exertion, Other (struggles with coughing 

spasms; increased over last several weeks; more acutely over last several days; 

more prominent with TF than prior)





- Gastrointestinal


Gastrointestinal: reports: Nausea (one episode last week), Other (Has bolus TF)

.  denies: Constipation





- Genitourinary


Genitourinary: reports: Incontinence, Other (recurrent UTI's and vaginal 

candidiasis)





- Musculoskeletal


Musculoskeletal: reports: Muscle aches (bilateral shoulder discomfort), 

Stiffness, Muscle weakness, Assistive devices (can walk with contact assist 

short distances but tips back), Transfer issues (mostly bed/wheelchair bound)





- Integumentary


Integumentary: reports: Dryness





- Neurological


Neurological: reports: General weakness, Other (speech more difficult to 

understand)





- Psychiatric


Psychiatric: reports: Anxiety (with increased cough episodes).  denies: 

Depression





- Endocrine


Endocrine: reports: Diabetes type 2 (BS good range)





- Hematologic/Lymphatic


Hematologic/Lymphatic: reports: Recurrent infections (utis/no pnemonia)





- All Other Systems


All Other Systems: reports: Reviewed and negative





Physical Exam





- Vital Signs


Temperature: 97.5 C


Pulse Rate: 75


Respiratory Rate: 18


O2 Saturation: 95


Blood Pressure: 122/80





- Physical Exam


General Appearance: positive: Moderate distress (coughing episodes)


Eyes Bilateral: positive: Other (vision worsening but able to make out face)


ENT: positive: Dry mucous membranes.  negative: Oral lesions


Neck: positive: Trachea midline, Stiff neck (tipped back; unable to straighten)


Cardiovascular: positive: Regular rate & rhythm


Respiratory: positive: Rales (crackles left upper lung;)


Abdomen: positive: Nml bowel sounds, Other (PEG exit site without s/s infection)


Skin: positive: Pallor


Extremities: positive: No pedal edema, Other (limited ROM in arms)


Neurologic/Psychiatric: positive: Oriented x3, Unintelligible speech





Palliative Care





- POLST


Patient has POLST: Yes


POLST Status: DNR, Limited Interventions


Pain: Pain unchanged, Location (bilateral shoulder pain 2.5 mg hydrocodone/325 

mg TID)


Tiredness/Fatigue: Mild (1-3)


Drowsiness/Sedation: None


Nausea: None


Depression: None


Anxiety: Mild (1-3)


Dyspnea: Moderate (4-6)


Sleep: Sleeps well


Constipation: Yes, Opoid induced, Managed


Feelings of wellbeing/Perceived Quality of Life: Good, Acceptable, Comment (

sister perceives worsening status; aware still enjoys family)


Performance Status: 


Patient with significant balance and vision issues, falls frequently if not 

assisted.  Thus comes the problem if she has any urgency tries to get up 

without assistance.  She can walk short distances with contact assist she is 

dependent for all ADLs.  She is on tube feedings.She is mostly wheelchair bound 

and has been quite a bit of time in bed as she does like her space and bed








- Palliative Care


Discussion: 


Discussed with patient my concerns, that she most likely is aspirating.  

Concern about her frequent coughing and risk for aspiration pneumonia.  Patient 

does not appear acutely ill, but also concerned about tube feeding and 

migration.  We did discuss most likely going to return back to continuous 

feedings.  Patient reports she still enjoys current quality of life, would 

treat reversible conditions and this was communicated to her sister Chidi.  

After much discussion will go ahead and send her into ED for evaluation as this 

has been an acute change in the last 48-72 hours.








Impression and Recommendations





- Palliative Care


Impression: 


This is a 72-year-old woman with progressive supranuclear nuclear palsy, 

presenting with acute changes in the last 4872 hours with increased choking, 

gradual increased difficulty with oral secretions, and noting increased choking 

and coughing with tube feedings which are currently bolus every 4 hours.  

Patient does present with concern for aspiration pneumonia, given patient goals 

of care will send in to the ED for workup.





Recommendations/Counseling Done: 


1.  Choking.  Noted by both staff and family having increased difficulty with 

oral secretions, increased cough with tube feedings, and weaker cough effort to 

expels or moves secretions.  Will send in for workup for aspiration pneumonia, 

given the acute changes.  Patient does not appear acutely ill, just with 

crackles in her upper left lobe.Did discuss before patient left most likely 

need to transition back to continuous feedings, she understands my concern and 

is in agreement.  She is still wanting to do tube feeding support.  Patient 

when transition to [facility], had ordered suction machine.  Will follow up 

though staff have it available.


2.  Abdominal "mass".  When patient coughs with tightened abdominal muscles 

does feel quite firm about the size of an orange.  On exam with no coughing 

soft protrusion nontender to palpation bowel sounds are normal bowels are 

moving without difficulty patient denies any notable sign or symptoms of 

distress.  I did check residual of tube feeding now noted.  PEG tube appears 

intact will have follow-up at ED.


3.  Advanced care planning.  SARAH ST in place with DNA are and limited 

interventions.  Patient continues to want to have tube feeding support as well 

as treatment of reversible conditions.  In the context of this will send her in 

for evaluation, unfortunately will need to send her in with 911 as patient 

unable to transport in car.  Report called into ED physician








Addendum; patient returned chest x-ray done with no infiltrates noted.  No labs 

done, according to ER doc with chest x-ray no concern regarding feeding tube no 

further workup done in the context of this.  Orders sent to [facility] for 

continuous feedings at 50 mils an hour, 150 mL bolus water.  Report to 

dietitiann Biswas who will do an evaluation with patient about preferences and 

proposed feeding plan.  Will follow up on suction machine.  The patient remains 

at high risk for aspiration pneumonia, sequela from a fall, and further decline 

related to her neurologic disease.  We will continue to support her for best 

quality of life, patient continues to receive her quality of life is acceptable

, at this point in time will continue to treat reversible conditions.  Long-

term plan would be to transition to hospice at appropriate interval.


Time Spent: 


Time spent 45 minutes with greater than 50% of this done in counseling 

coordination of care transition to ED as well as follow-up with clinical staff 

and anticipatory guidance with her sister.

## 2017-12-14 ENCOUNTER — HOSPITAL ENCOUNTER (OUTPATIENT)
Dept: HOSPITAL 76 - PC | Age: 72
Discharge: HOME | End: 2017-12-14
Attending: NURSE PRACTITIONER
Payer: MEDICARE

## 2017-12-14 DIAGNOSIS — Z79.84: ICD-10-CM

## 2017-12-14 DIAGNOSIS — G23.1: ICD-10-CM

## 2017-12-14 DIAGNOSIS — E11.9: ICD-10-CM

## 2017-12-14 DIAGNOSIS — Z51.5: Primary | ICD-10-CM

## 2017-12-14 DIAGNOSIS — J18.9: ICD-10-CM

## 2017-12-14 DIAGNOSIS — F32.9: ICD-10-CM

## 2017-12-14 DIAGNOSIS — Z66: ICD-10-CM

## 2017-12-14 DIAGNOSIS — R32: ICD-10-CM

## 2017-12-14 DIAGNOSIS — Z79.891: ICD-10-CM

## 2017-12-14 DIAGNOSIS — K21.9: ICD-10-CM

## 2017-12-14 DIAGNOSIS — Z93.1: ICD-10-CM

## 2017-12-14 PROCEDURE — 99309 SBSQ NF CARE MODERATE MDM 30: CPT

## 2017-12-14 NOTE — CONSULTATION NOTE
Palliative Care Follow Up





- Referral


Referring Provider: Dr. Fair


Time of Visit: 8787-9346


Referral setting: Skilled Nursing Facility


Referral Reason: PSP/Follow up pneumonia





- Information Sources


Records reviewed: Previous records reviewed


History/Review of Systems obtained from: Patient


Exam limitations: Clinical condition (Patient with progressive disease, more 

difficult to understand.  Does appear alert and oriented today answers 

appropriate.)





- History of Present Illness Update


Brief HPI Update: 


This is a zen 72-year-old woman with progressive supranuclear palsy, she is 

currently being supported at a nursing home.  She was seen by her primary care 

provider Dr. Alas over the weekend, she was having increased lethargy and 

moist cough.  A chest x-ray did show a left lower lobe infiltrate.  She had 

aspirated previously a couple weeks before, but on ED exam at that point in 

time was not showing symptomatic.  Today she reports the mucus is better, she 

is tolerating her continuous tube feedings at 80 mils an hour, with the 

addition of the omeprazole and change from the bolus feedings it does appear 

she is doing better.  I changes include though increased difficulty with 

communication, intermittent confusion though she has reoriented, and now she is 

mostly incontinent.  She also has some upper extremity contractures, she 

reports her bilateral shoulder pain is intermittent but controlled on her low 

dose of hydrocodone.  She continues to perceive though with all her limitations

, that her quality of life is still acceptable.  She does not present with any 

existential concerns or distress today








Social History





- Living Situation


Living arrangement: Nursing home


Support System: 


Sister Chidi oversees patient's care, she does have many friends and family 

that do provide support.  She is a paid caregiver Teo it does take her out of 

the facility still though I suspect this is getting more difficult.  She had 

gone out today and is feeling quite exhausted.








Medications/Allergies





- Medications


Home Medications: 


 Ambulatory Orders











 Medication  Instructions  Recorded  Confirmed


 


Aspirin [Aspir 81] 81 mg PEG DAILY 09/24/13 12/14/17


 


Losartan [Cozaar] 25 mg PEG DAILY 09/24/13 12/14/17


 


Brimonidine 0.2% Ophth Drops 1 drops EACHEYE BID 03/05/17 12/14/17





[Alphagan P 0.2% Ophth Drops]   


 


Dorzolamide 2% Ophth Drops 1 drops EACHEYE BID 03/05/17 12/14/17





[Trusopt 2% Ophth Drops]   


 


Latanoprost 0.005% Ophth Drops 1 drops EACHEYE DAILY 03/05/17 12/14/17





[Xalatan Ophth Drops]   


 


Acetaminophen 500 mg PEG Q6HR PRN 07/19/17 12/14/17


 


Bisacodyl Supp [Dulcolax Supp] 10 mg [place] DAILY PRN 07/19/17 12/14/17


 


Escitalopram Oxalate 10 mg PEG DAILY 07/19/17 12/14/17


 


HYDROcod/ACETAM 5/325 [Norco 5/325] 2.5 mg PEG Q8HR PRN 07/19/17 12/14/17


 


Loperamide HCl [Loperamide] 10 ml PEG Q6HR PRN 07/19/17 12/14/17


 


Multivitamin W/Minerals [Theragran 10 ml PEG DAILY 07/19/17 12/14/17





M]   


 


Polyethylene Glycol 3350 [Miralax] 17 gm PEG DAILY 07/19/17 12/14/17


 


guaiFENesin/DEXTROMETHORPHAN 10 ml PEG Q6HR PRN 07/19/17 12/14/17





[Robitussin Dm]   


 


HYDROcod/ACETAM 5/325 [Norco 5/325] 2.5 mg PEG Q8HR PRN 09/13/17 12/14/17


 


Metformin HCl 500 mg PEG BID 09/13/17 12/14/17


 


Ondansetron [Zuplenz] 8 mg PEG Q8HR PRN 09/13/17 12/14/17


 


Senna [Senokot] 8.6 mg PEG DAILY 11/05/17 12/14/17


 


Levofloxacin [Levaquin] 500 mg PEG DAILY 12/14/17 12/14/17


 


Omeprazole Magnesium [Prilosec] 20 mg PEG BID 12/14/17 12/14/17














- Allergies


Allergies/Adverse Reactions: 


 Allergies











Allergy/AdvReac Type Severity Reaction Status Date / Time


 


Iodinated Contrast- Oral and Allergy Severe Respiratory Verified 11/28/17 12:35





IV Dye     





[Iodinated Contrast Media -     





IV Dye]     


 


bee pollen [Bee Pollen] Allergy  Hives Verified 11/28/17 12:35


 


morphine AdvReac Intermediate Hallucinati Verified 11/28/17 12:35





   ons  














Review of Systems





- Constitutional


Constitutional: reports: Weight loss (153.2 12/03 ------11/08 154.9)





- Eyes


Eyes: reports: Vision loss, Corrective lenses





- Ears, Nose & Throat


Ears, Nose & Throat: reports: Dry mouth





- Respiratory


Respiratory: reports: Cough (both staff and patient feels improved), Sputum 

production (reports "mucous" less)





- Genitourinary


Genitourinary: reports: Incontinence (consistently incontinent now)





- Musculoskeletal


Musculoskeletal: reports: Stiffness (upper arms noted increase with contractures

), Limited range of motion, Assistive devices (still able to ambulate with 

contact assist; try to daily)





- Integumentary


Integumentary: reports: Dryness





- Neurological


Neurological: reports: General weakness, Memory problems, Slurred speech (voice 

soft, more difficult to understand)





- Psychiatric


Psychiatric: denies: Depression, Anxiety, Suicidal





- Endocrine


Endocrine: reports: Diabetes type 2 (reviewed 3x week BS good range)





- Hematologic/Lymphatic


Hematologic/Lymphatic: reports: Recurrent infections (first pneumonia; previous 

had been UTIs)





- All Other Systems


All Other Systems: reports: Reviewed and negative





Physical Exam





- Vital Signs


Temperature: 98.0 C


Pulse Rate: 92


Respiratory Rate: 18


O2 Saturation: 95 (ra @ rest)


Blood Pressure: 104/78





- Physical Exam


General Appearance: positive: Alert


Eyes Bilateral: positive: Conjunctivae nml


ENT: positive: Dry mucous membranes


Neck: positive: Trachea midline, Stiff neck (tipped back; unable to move head 

forward)


Cardiovascular: positive: Regular rate & rhythm


Respiratory: positive: Diminished in bases, Other (no cough during visit).  

negative: Wheezes, Rales, Rhonchi


Abdomen: positive: Non-tender, Soft, Nml bowel sounds, Other (patient reports 

some tenderness in vaginal area; no s/s of candidiasis observed; patient denies 

pruritis; now incontinent)


Skin: positive: Pallor.  negative: Pressure wound


Extremities: positive: No pedal edema.  negative: Full ROM (limited rom upper 

extremities; lower okay)


Neurologic/Psychiatric: positive: Mood/affect nml, Disoriented to time, Slurred/

abnml speech





Palliative Care





- POLST


Patient has POLST: Yes


POLST Status: DNR, Limited Interventions


Pain: Pain unchanged, Location (bilataral shoulder pain; right greater than left

; on scheduled meds)


Tiredness/Fatigue: Moderate (4-6)


Drowsiness/Sedation: None


Nausea: None


Depression: None


Anxiety: None


Dyspnea: Mild (1-3)


Sleep: Variable sleep pattern


Constipation: No, Opoid induced, Managed


Feelings of wellbeing/Perceived Quality of Life: Fair, Acceptable, No change


Performance Status: 


Patient is dependent on staff for all ADLs, she is able to still walk a few 

steps, and transfer to the wheelchair.  She does enjoy outings though they are 

more fatiguing.








- Palliative Care


Discussion: 


Patient looking forward to [place], increase activity and visiting with 

people.  She does express loss at the ability to communicate with Dexter, he is 

hard of hearing and her voice is getting weaker.  She still perceives her 

quality of life is acceptable, she denies any worries or distress today.  She 

reports her depression is well controlled.  She still has a very sweet sense of 

humor, and still finds hitesh in the day-to-day happenings.








Impression and Recommendations





- Palliative Care


Impression: 


This is a 72-year-old woman with progressive supranuclear palsy, recovering 

from left lower lobe pneumonia.  Secretions are improved, she still has some 

residual fatigue, but feels better overall.  Her care plan has been mitigated 

to decrease her aspiration risk, she is tolerating the tube feedings at 80 mils 

an hour, her heartburn is better, and still continues to enjoy her current 

quality of life.  Her goals are to continue with supportive care, treat 

infections, as long as this is acceptable to her, her sister Chidi who 

oversees her care supports her in this goal as well





Recommendations/Counseling Done: 


1.  Pneumonia.  Patient responded to her current dose of Levaquin last day of 

antibiotics is tomorrow.  No further follow-up or change required at this point 

in time.  





2.  Dysphagia.Patient on PEG tube feedings, change in care plan to mitigate 

aspiration risk.  Patient is tolerating 80 mils an hour along with water 

flushes.  She has had a couple pound weight loss, I am not concerned at this 

point in time.  We will continue current care plan





3.  GERD.  She is doing better on the omeprazole, her family has decided to pay 

as it is not on formulary.  She is tolerating this without any difficulty.


4.  Incontinence.  This is most likely as a result of progressive disease.  

This is somewhat distressing to her, she is at high risk for vaginal 

candidiasis given her recent antibiotics.  Vaginal exam does not show any signs 

of redness or irritation though she does report discomfort.  Will have them 

start applying barrier cream, to protect and increase her comfort.


5.  Depression.  Patient denies any symptoms of depressive mood, she is looking 

forward to [place], she does not present with any symptoms of existential 

distress.  She is being supported by the palliative care .  She remains 

quite clear and her goals as far as what is acceptable quality of life.





Time Spent: 


Minutes with greater than 50% of this done in counseling regarding depression, 

follow-up on response to antibiotics, and anticipatory guidance.  Did leave a 

message and report for her sister Chidi.

## 2018-01-15 ENCOUNTER — HOSPITAL ENCOUNTER (OUTPATIENT)
Dept: HOSPITAL 76 - PC | Age: 73
Discharge: HOME | End: 2018-01-15
Attending: NURSE PRACTITIONER
Payer: MEDICARE

## 2018-01-15 DIAGNOSIS — I10: ICD-10-CM

## 2018-01-15 DIAGNOSIS — Z51.5: Primary | ICD-10-CM

## 2018-01-15 DIAGNOSIS — Z87.01: ICD-10-CM

## 2018-01-15 DIAGNOSIS — Z79.891: ICD-10-CM

## 2018-01-15 DIAGNOSIS — M25.512: ICD-10-CM

## 2018-01-15 DIAGNOSIS — Z99.3: ICD-10-CM

## 2018-01-15 DIAGNOSIS — G23.1: ICD-10-CM

## 2018-01-15 DIAGNOSIS — M25.511: ICD-10-CM

## 2018-01-15 DIAGNOSIS — Z66: ICD-10-CM

## 2018-01-15 DIAGNOSIS — F32.9: ICD-10-CM

## 2018-01-15 DIAGNOSIS — R63.4: ICD-10-CM

## 2018-01-15 DIAGNOSIS — I95.9: ICD-10-CM

## 2018-01-15 DIAGNOSIS — K21.9: ICD-10-CM

## 2018-01-15 DIAGNOSIS — E11.9: ICD-10-CM

## 2018-01-15 DIAGNOSIS — R44.1: ICD-10-CM

## 2018-01-15 PROCEDURE — 99310 SBSQ NF CARE HIGH MDM 45: CPT

## 2018-01-16 NOTE — CONSULTATION NOTE
Palliative Care Follow Up





- Referral


Referring Provider: Dr. Kaykay Alas


Time of Visit: January 15, 2018 [id number]


Referral setting: Skilled Nursing Facility


Referral Reason: PSP





- Information Sources


Records reviewed: RN notes reviewed, Previous records reviewed


History/Review of Systems obtained from: Patient, Family (sister Cihdi), 

Caregiver (clinical staff)


Exam limitations: Clinical condition (patient with some STM issues; confused at 

times on date/time/events)





- History of Present Illness Update


Brief HPI Update: 


Is a zen 72-year-old woman with progressive supranuclear palsy, she is 

currently being supported at a nursing home secondary to the need for tube 

feedings.  She most recently was treated for pneumonia in December, she has 

since recovered from this.  We have been modifying her tube feeding to better 

provide support for her quality of life.  Dietitian is working with me at this 

point in time she is off from 10 to 4 PM.  She has had some weight loss, I 

suspect this is related to her increase activity during the day as well as the 

transition time.  Her primary care provider has continued to decrease pill 

burden as well.  Patient does present today with ongoing lower blood pressures.

  Will go ahead and decrease her blood pressure medication.  She does have 

bilateral shoulder plan pain as well as increasing contractures of her upper 

extremities.  She is having more hyperextension of her neck, PT /OT have been 

ordered as far as a wheelchair adjustment and sitting.  Patient continues to 

accept her current quality of life, finds it acceptable, and continues to find 

things to enjoy on a daily basis.








Social History





- Living Situation


Living arrangement: Nursing home (Patient sister Chidi oversees care, she does 

visit at least 2 or 3 times a week.  She does have a paid caregiver continues 

to take her out to get her nails, she is still able to tolerate this so this is 

getting more difficult and more tiring.  She did enjoy her [place] holiday, 

she does seem quite content and settled in to her current situation.)





Medications/Allergies





- Medications


Home Medications: 


 Ambulatory Orders











 Medication  Instructions  Recorded  Confirmed


 


Losartan [Cozaar] 12.5 mg PEG DAILY 09/24/13 01/16/18


 


Brimonidine 0.2% Ophth Drops 1 drops EACHEYE BID 03/05/17 01/16/18





[Alphagan P 0.2% Ophth Drops]   


 


Dorzolamide 2% Ophth Drops 1 drops EACHEYE BID 03/05/17 01/16/18





[Trusopt 2% Ophth Drops]   


 


Latanoprost 0.005% Ophth Drops 1 drops EACHEYE DAILY 03/05/17 01/16/18





[Xalatan Ophth Drops]   


 


Acetaminophen 500 mg PEG Q6HR PRN 07/19/17 01/16/18


 


Bisacodyl Supp [Dulcolax Supp] 10 mg [place] DAILY PRN 07/19/17 01/16/18


 


Escitalopram Oxalate 10 mg PEG DAILY 07/19/17 01/16/18


 


HYDROcod/ACETAM 5/325 [Norco 5/325] 2.5 mg PEG Q8HR PRN 07/19/17 01/16/18


 


Loperamide HCl [Loperamide] 10 ml PEG Q6HR PRN 07/19/17 01/16/18


 


Multivitamin W/Minerals [Theragran 10 ml PEG DAILY 07/19/17 01/16/18





M]   


 


Polyethylene Glycol 3350 [Miralax] 17 gm PEG DAILY 07/19/17 01/16/18


 


guaiFENesin/DEXTROMETHORPHAN 10 ml PEG Q6HR PRN 07/19/17 01/16/18





[Robitussin Dm]   


 


HYDROcod/ACETAM 5/325 [Norco 5/325] 2.5 mg PEG Q8HR PRN 09/13/17 01/16/18


 


Ondansetron [Zuplenz] 8 mg PEG Q8HR PRN 09/13/17 01/16/18


 


Senna [Senokot] 8.6 mg PEG .EVERY OTHER DAY 11/05/17 01/16/18


 


Omeprazole Magnesium [Prilosec] 20 mg PEG BID 12/14/17 01/16/18














- Allergies


Allergies/Adverse Reactions: 


 Allergies











Allergy/AdvReac Type Severity Reaction Status Date / Time


 


Iodinated Contrast- Oral and Allergy Severe Respiratory Verified 11/28/17 12:35





IV Dye     





[Iodinated Contrast Media -     





IV Dye]     


 


bee pollen [Bee Pollen] Allergy  Hives Verified 11/28/17 12:35


 


morphine AdvReac Intermediate Hallucinati Verified 11/28/17 12:35





   ons  














Review of Systems





- Constitutional


Constitutional: reports: Weight loss (147.4 about 7 pounds over last few weeks;)





- Eyes


Eyes: reports: Vision loss (reports worsening;)





- Ears, Nose & Throat


Ears, Nose & Throat: reports: Dry mouth





- Respiratory


Respiratory: reports: Cough (less; able to manage secretions), SOB with 

exertion.  denies: SOB at rest





- Gastrointestinal


Gastrointestinal: denies: Constipation, Nausea, Reflux/heartburn





- Genitourinary


Genitourinary: reports: Dysuria (occasionally), Incontinence





- Musculoskeletal


Musculoskeletal: reports: Stiffness, Limited range of motion, Joint pain (

bilateral shoulder pain. left worsening, reports mostly with transfers only; no 

pain at time of exam), Other (still able to ambulate wtih assistance/guidance 

short distances)





- Integumentary


Integumentary: reports: Dryness





- Neurological


Neurological: reports: General weakness, Memory problems





- Psychiatric


Psychiatric: reports: Hallucinations (confirmed by sister as well; has been a 

few months; denies being frightened; is aware she has them; unable to recall 

content; sister reports usually dogs/people)





- Endocrine


Endocrine: reports: Diabetes type 2 (in good range; discontinued metformin by 

PCP)





- Hematologic/Lymphatic


Hematologic/Lymphatic: reports: Recurrent infections (recently treated for 

pneumonia; no further symptoms)





- All Other Systems


All Other Systems: reports: Reviewed and negative





Physical Exam





- Vital Signs


Temperature: 97.7 C


Pulse Rate: 63


Respiratory Rate: 18


O2 Saturation: 98 (ra @ rest)


Blood Pressure: 92/62





- Physical Exam


General Appearance: positive: No acute distress


Eyes Bilateral: positive: Other (watery but clear)


ENT: positive: No signs of dehydration (strong odor with breath)


Neck: positive: Stiff neck (hyperextended back; unable to move forward;)


Cardiovascular: positive: Regular rate & rhythm


Respiratory: positive: Diminished in bases.  negative: Wheezes, Rales, Rhonchi


Abdomen: positive: Non-tender, Soft, Nml bowel sounds


Skin: positive: Pallor, Dryness


Extremities: positive: No pedal edema


Neurologic/Psychiatric: positive: Mood/affect nml, Disoriented to time, Slurred/

abnml speech (voice remains very quiet; able to discern about 70%)





Palliative Care





- POLST


Patient has POLST: Yes


POLST Status: DNR, Selective Treatment


Pain: Pain worsening, Location (bilateral shoulder pain; only receving 2.5 mg 

hydrocodone/apap TID; does not want it increased)


Tiredness/Fatigue: Moderate (4-6)


Drowsiness/Sedation: Mild (1-3)


Nausea: None


Depression: None


Anxiety: None


Dyspnea: Mild (1-3)


Constipation: No, Opoid induced, Managed


Feelings of wellbeing/Perceived Quality of Life: Fair, Acceptable, No change


Performance Status: 


Patient remains mostly wheelchair bound, she is dependent for all ADLs.  We 

have arranged her tube feedings to be able to be more participatory in the day 

with it off 3:50 PM.  She does like the facilities activities.








- Palliative Care


Discussion: 


Patient reports she does not have any worries or concerns, she does report that 

she is having daily hallucinations though she does not often remember what they 

are about.  She reports it has been for several weeks now.  She has a zen 

new posterior with her great granddaughters, she really enjoyed the holidays.  

Sclerae still comes to visit weekly, as well as other family members and 

friends.  She continues to participate fully in life, but finds it still worth 

living and acceptable even at her diminished level.  She still finds humor, and 

has a very infectious laugh.





Did check in with him sister, reports changes that she has seen has been the 

increase in hallucinations and more difficult understanding her.  Only been a 

few frightening ones where she is seeing someone outside.  She does appear to 

understand that she is confused at times.  She often will describe her rooms 

not ready, or her other room.  Chidi has just learned to go along with that, 

and has encouraged others as well.  She continues to support patient and 

oversee all her care needs, she does visit on a regular basis to monitor care 

receiving








Impression and Recommendations





- Palliative Care


Impression: 


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 

is currently living at a nursing home.  She has had a recent infection of 

pneumonia, this is since resolved.  Her secretions continue to be improved, she 

is having no further GERD symptoms, and is feeling better overall.  She does 

present today with ongoing hypotension, have continued to decrease her pill 

burden along with the PCP.  Her goals are continued with supportive care, treat 

infections as long as this is acceptable quality of life for her, and her 

sister Chidi who oversees her care supports his goals as well.





Recommendations/Counseling Done: 


1.  Weight loss.  Patient is more active during this transition time I suspect 

has received less calories.  She does not have a stable tube feeding Which 

accommodates her schedule to be able to participate in facility activities.  

Goal at this point in time is just no further weight loss.  Patient actually 

looks more comfortable at a lighter weight.  She is no longer having any 

further GERD symptoms and is tolerating her feeding quite well.





2.  Bilateral shoulder pain.  She is on a very low dose of hydrocodone, she is 

not interested in increasing the dosing at this point in time.  I will see if 

therapy who is going to be evaluating for wheelchair has any further 

suggestions or modifications that might increase her comfort, it does appear 

this is most painful with transfers and staff handling.


3.  Dysphagia.  Patient has not had any further reflux or choking with 

continuous and fusion of her feeding.  Her GERD is currently controlled.  She 

is getting water flushes and remaining hydrated.


4.  Depression.  Patient denies any signs or symptoms of depressive mood, she 

is being supported by the palliative care  and presents with no 

concerns at this point in time by myself, sister, or staff.


5.  Hypertension.  Will go ahead and decrease her losartan to 12.5 mg daily 

continue to evaluate over the next few weeks and discontinue if not needed.


6.  Advanced care planning.  SARAH ST in place, patient has somewhat plateaued 

though with some soft or signs of decline including weight loss, increased 

hallucinations, some increased contractures, and increased weakness 

particularly in her upper extremities.  Patient remains quite hopeful that with 

focus on quality of life issues she will continue to find something to look 

forward to enjoy an every day





Time Spent: 


Time spent 45 minutes with greater than 50% of this done in counseling 

coordination of care follow-up with staff as well as sister and anticipatory 

guidance provided

## 2018-01-23 ENCOUNTER — HOSPITAL ENCOUNTER (OUTPATIENT)
Dept: HOSPITAL 76 - LAB.R | Age: 73
Discharge: HOME | End: 2018-01-23
Attending: SKILLED NURSING FACILITY
Payer: MEDICARE

## 2018-01-23 DIAGNOSIS — R30.0: Primary | ICD-10-CM

## 2018-01-23 LAB
CLARITY UR REFRACT.AUTO: CLEAR
GLUCOSE UR QL STRIP.AUTO: NEGATIVE MG/DL
KETONES UR QL STRIP.AUTO: NEGATIVE MG/DL
NITRITE UR QL STRIP.AUTO: POSITIVE
PH UR STRIP.AUTO: >=9 PH (ref 5–7.5)
PROT UR STRIP.AUTO-MCNC: NEGATIVE MG/DL
RBC # UR STRIP.AUTO: NEGATIVE /UL
RBC # URNS HPF: (no result) /HPF (ref 0–5)
SP GR UR STRIP.AUTO: 1.01 (ref 1–1.03)
SQUAMOUS URNS QL MICRO: (no result)
UROBILINOGEN UR QL STRIP.AUTO: (no result) E.U./DL
UROBILINOGEN UR STRIP.AUTO-MCNC: NEGATIVE MG/DL

## 2018-01-23 PROCEDURE — 81001 URINALYSIS AUTO W/SCOPE: CPT

## 2018-01-23 PROCEDURE — 81003 URINALYSIS AUTO W/O SCOPE: CPT

## 2018-01-23 PROCEDURE — 87086 URINE CULTURE/COLONY COUNT: CPT

## 2018-02-04 ENCOUNTER — HOSPITAL ENCOUNTER (OUTPATIENT)
Dept: HOSPITAL 76 - LAB.R | Age: 73
End: 2018-02-04
Attending: SKILLED NURSING FACILITY
Payer: MEDICARE

## 2018-02-04 DIAGNOSIS — E88.9: Primary | ICD-10-CM

## 2018-02-04 DIAGNOSIS — Q66.7: ICD-10-CM

## 2018-02-04 LAB
ANION GAP SERPL CALCULATED.4IONS-SCNC: 8 MMOL/L (ref 6–13)
BASOPHILS NFR BLD AUTO: 0 10^3/UL (ref 0–0.1)
BASOPHILS NFR BLD AUTO: 0.5 %
BUN SERPL-MCNC: 23 MG/DL (ref 6–20)
CALCIUM UR-MCNC: 9.3 MG/DL (ref 8.5–10.3)
CHLORIDE SERPL-SCNC: 102 MMOL/L (ref 101–111)
CO2 SERPL-SCNC: 25 MMOL/L (ref 21–32)
CREAT SERPLBLD-SCNC: 0.7 MG/DL (ref 0.4–1)
EOSINOPHIL # BLD AUTO: 0.1 10^3/UL (ref 0–0.7)
EOSINOPHIL NFR BLD AUTO: 1.8 %
ERYTHROCYTE [DISTWIDTH] IN BLOOD BY AUTOMATED COUNT: 12.8 % (ref 12–15)
GFRSERPLBLD MDRD-ARVRAT: 82 ML/MIN/{1.73_M2} (ref 89–?)
GLUCOSE SERPL-MCNC: 130 MG/DL (ref 70–100)
HGB UR QL STRIP: 13.3 G/DL (ref 12–16)
LYMPHOCYTES # SPEC AUTO: 1 10^3/UL (ref 1.5–3.5)
LYMPHOCYTES NFR BLD AUTO: 17.1 %
MCH RBC QN AUTO: 30.5 PG (ref 27–31)
MCHC RBC AUTO-ENTMCNC: 34.2 G/DL (ref 32–36)
MCV RBC AUTO: 89.3 FL (ref 81–99)
MONOCYTES # BLD AUTO: 0.5 10^3/UL (ref 0–1)
MONOCYTES NFR BLD AUTO: 8.9 %
NEUTROPHILS # BLD AUTO: 4.1 10^3/UL (ref 1.5–6.6)
NEUTROPHILS # SNV AUTO: 5.7 X10^3/UL (ref 4.8–10.8)
NEUTROPHILS NFR BLD AUTO: 71.7 %
PDW BLD AUTO: 9.5 FL (ref 7.9–10.8)
PLATELET # BLD: 244 10^3/UL (ref 130–450)
RBC MAR: 4.34 10^6/UL (ref 4.2–5.4)
SODIUM SERPLBLD-SCNC: 135 MMOL/L (ref 135–145)

## 2018-02-04 PROCEDURE — 80048 BASIC METABOLIC PNL TOTAL CA: CPT

## 2018-02-04 PROCEDURE — 85025 COMPLETE CBC W/AUTO DIFF WBC: CPT

## 2018-02-09 ENCOUNTER — HOSPITAL ENCOUNTER (OUTPATIENT)
Dept: HOSPITAL 76 - PC | Age: 73
Discharge: HOME | End: 2018-02-09
Attending: NURSE PRACTITIONER
Payer: MEDICARE

## 2018-02-09 DIAGNOSIS — M24.50: ICD-10-CM

## 2018-02-09 DIAGNOSIS — Z93.1: ICD-10-CM

## 2018-02-09 DIAGNOSIS — Z66: ICD-10-CM

## 2018-02-09 DIAGNOSIS — Z79.891: ICD-10-CM

## 2018-02-09 DIAGNOSIS — K59.03: ICD-10-CM

## 2018-02-09 DIAGNOSIS — R13.10: ICD-10-CM

## 2018-02-09 DIAGNOSIS — G23.1: ICD-10-CM

## 2018-02-09 DIAGNOSIS — M25.511: ICD-10-CM

## 2018-02-09 DIAGNOSIS — M25.512: ICD-10-CM

## 2018-02-09 DIAGNOSIS — Z51.5: Primary | ICD-10-CM

## 2018-02-09 PROCEDURE — 99310 SBSQ NF CARE HIGH MDM 45: CPT

## 2018-02-09 NOTE — CONSULTATION NOTE
Palliative Care Follow Up





- Referral


Referring Provider: Dr. Cherrie Alas


Time of Visit: 8019-2134


Referral setting: Skilled Nursing Facility


Referral Reason: PSP





- Information Sources


Records reviewed: RN notes reviewed, Previous records reviewed


History/Review of Systems obtained from: Patient, Family (Spoke with sister 

Chidi), Caregiver (Follow up with caregiver Mary)


Exam limitations: Clinical condition (Patient with decreased ability to speak, 

also appears to have more difficulty following conversation and delayed in 

answering.)





- History of Present Illness Update


Brief HPI Update: 


This is a zen 72-year-old woman with progressive supranuclear palsy, 

currently is at a nursing home secondary to the need for support for tube 

feedings.  She is continuing to decline, though not acutely, she has had 

increased vision changes, reports less energy, more balance problems, as well 

as increasing weakness.  She also presents presents today with somewhat of a 

locked jaw, and chewing and picking at her lips.  She is on continuous tube 

feedings, her weight has remained stable with her last weight on 26 146.7 she 

did have some GERD symptoms, but they had run into trouble with the pharmacy 

and she had not received her omeprazole for several days.  She reports she 

continues to have pain bilaterally in her shoulders, 0 at rest, more so when 

she is up, she also has pain in her right arm she does tend to lean on this, 

and does have increased contractures.  She reports she is managing her saliva 

okay, check with caregiver, sister, and staff no increasing cough, so regular 

caregiver Teo reports less strength and cough effort.  Her transfers have 

worsened, she has less trunk stability, and vomiting is a longer to able to 

take her out in her car.  They have signed up for paratransit and will continue 

their weekly nail appointments.








Social History





- Living Situation


Living arrangement: Nursing home (Patient sister Chidi oversees her care, she 

does visit her 2-3 times a week.  Teo her paid caregiver comes regularly and 

helps her with her hygiene as well as socialization.  Continue have some 

frustrations as far as isolation and loneliness, patient does like to be in her 

bed more, she reports this is more comforting for her as well as her increased 

weakness.)





Medications/Allergies





- Medications


Home Medications: 


 Ambulatory Orders











 Medication  Instructions  Recorded  Confirmed


 


Losartan [Cozaar] 12.5 mg PEG DAILY 09/24/13 02/09/18


 


Brimonidine 0.2% Ophth Drops 1 drops EACHEYE BID 03/05/17 02/09/18





[Alphagan P 0.2% Ophth Drops]   


 


Dorzolamide 2% Ophth Drops 1 drops EACHEYE BID 03/05/17 02/09/18





[Trusopt 2% Ophth Drops]   


 


Latanoprost 0.005% Ophth Drops 1 drops EACHEYE DAILY 03/05/17 02/09/18





[Xalatan Ophth Drops]   


 


Acetaminophen 500 mg PEG Q6HR PRN 07/19/17 02/09/18


 


Bisacodyl Supp [Dulcolax Supp] 10 mg [place] DAILY PRN 07/19/17 02/09/18


 


Escitalopram Oxalate 10 mg PEG DAILY 07/19/17 02/09/18


 


HYDROcod/ACETAM 5/325 [Norco 5/325] 2.5 mg PEG Q8HR PRN 07/19/17 02/09/18


 


Loperamide HCl [Loperamide] 10 ml PEG Q6HR PRN 07/19/17 02/09/18


 


Multivitamin W/Minerals [Theragran 10 ml PEG DAILY 07/19/17 02/09/18





M]   


 


Polyethylene Glycol 3350 [Miralax] 17 gm PEG DAILY 07/19/17 02/09/18


 


guaiFENesin/DEXTROMETHORPHAN 10 ml PEG Q6HR PRN 07/19/17 02/09/18





[Robitussin Dm]   


 


HYDROcod/ACETAM 5/325 [Norco 5/325] 2.5 mg PEG Q8HR PRN 09/13/17 02/09/18


 


Ondansetron [Zuplenz] 8 mg PEG Q8HR PRN 09/13/17 02/09/18


 


Senna [Senokot] 8.6 mg PEG .EVERY OTHER DAY 11/05/17 02/09/18


 


Omeprazole Magnesium [Prilosec] 20 mg PEG BID 12/14/17 02/09/18














- Allergies


Allergies/Adverse Reactions: 


 Allergies











Allergy/AdvReac Type Severity Reaction Status Date / Time


 


Iodinated Contrast- Oral and Allergy Severe Respiratory Verified 11/28/17 12:35





IV Dye     





[Iodinated Contrast Media -     





IV Dye]     


 


bee pollen [Bee Pollen] Allergy  Hives Verified 11/28/17 12:35


 


morphine AdvReac Intermediate Hallucinati Verified 11/28/17 12:35





   ons  














Review of Systems





- Constitutional


Constitutional: reports: Fatigue, Weight loss (146.7)





- Eyes


Eyes: reports: Vision loss (worsening vision)





- Ears, Nose & Throat


Ears, Nose & Throat: reports: Other (picking at lips/)





- Cardiovascular


Cardiovascular: denies: Chest pain





- Respiratory


Respiratory: reports: Cough (no increase or perception is problem; CG reports 

weaker effort when does cough)





- Gastrointestinal


Gastrointestinal: reports: Reflux/heartburn (was out of omeprazole for few days)

.  denies: Constipation, Diarrhea, Nausea





- Genitourinary


Genitourinary: reports: Incontinence (reports would be more continent if 

toileted more often)





- Musculoskeletal


Musculoskeletal: reports: Stiffness, Limited range of motion (right arm; 

bilateral shoulders), Transfer issues (uses wheelchair when up)





- Integumentary


Integumentary: reports: Dryness





- Neurological


Neurological: reports: General weakness, Memory problems (patient feels 

worsening as well as staff/family observations), Slurred speech (more difficult 

to understand)





- Psychiatric


Psychiatric: reports: Hallucinations.  denies: Depression, Anxiety





- Endocrine


Endocrine: reports: Diabetes type 2 (blood sugars doing fine)





- Hematologic/Lymphatic


Hematologic/Lymphatic: reports: Recurrent infections (UTIS; just finished 

treatment; tx for pneumonia in December).  denies: Anemia





- All Other Systems


All Other Systems: reports: Reviewed and negative





Physical Exam





- Vital Signs


Temperature: 98.3 C


Pulse Rate: 60


Respiratory Rate: 18


O2 Saturation: 95 (ra @ rest)


Blood Pressure: 108/62





- Physical Exam


General Appearance: positive: No acute distress


Eyes Bilateral: positive: Other (eyes appear more fixed; more difficulty 

focusing)


ENT: positive: Other (has halitosis; mouth clean; unable to open jaw greater 

than about 1 cm; biting on lip; reports has been worsening over last few months

; no painful but making oral care more difficult)


Cardiovascular: positive: Regular rate & rhythm


Respiratory: positive: Diminished throughout.  negative: Wheezes, Rales, Rhonchi


Abdomen: positive: Non-tender, Soft, Nml bowel sounds


Skin: positive: Pallor, Dryness.  negative: Pressure wound


Extremities: positive: Other (limited ROM right arm)


Neurologic/Psychiatric: positive: Mood/affect nml, Disoriented to time, Weakness

, Other (more difficulty with speech and tracking conversation; word finding; 

memory)





Palliative Care





- POLST


Patient has POLST: Yes


POLST Status: DNR, Selective Treatment


Pain: Pain worsening, Location (right arm; bilateral shoulder pain; no pain at 

rest worse with transfers)


Tiredness/Fatigue: Moderate (4-6)


Drowsiness/Sedation: Mild (1-3)


Nausea: Mild (1-3)


Depression: None


Anxiety: None


Dyspnea: None


Sleep: Sleeps well


Constipation: Yes, Opoid induced, Managed


Feelings of wellbeing/Perceived Quality of Life: Fair, Acceptable, Worsening


Performance Status: 


Patient dependent for all ADLs, is spending more time in bed.  She does get 

bathing twice a week, Eto comes and provide support such as oral care and 

pericare.  She is less able now to get out, they are working with paratransit.  

She is less able to participate in assisting in her own care.








- Palliative Care


Discussion: 


Patient more difficult to understand, and having more trouble with conversation 

than in the past.  We did revisit her current quality of life.  She still finds 

hitesh in things to appreciate, she does have a fairly significant babatunde-based 

approach to all of this as well as quite pragmatic.  She does say "I am getting 

worse".  She denies fearfulness or distress related to this.  She is getting 

visits from the palliative care .  She does like to be in her middle 

decorated space, and in her bed, this is what brings her comfort.


Spoke with sister Chidi on the phone, she continues to have frustrations with 

regarding her care plan, but for the most part again sees Dara is quite 

resilient.  She has seen more confusion, more difficulty tracking as well as 

well as Teo her caregiver.  They have not seen any signs or symptoms of 

depression or concern at this point in time everybody recognizing her slow 

decline, no significant care decisions have needed to be made recently.








Results





- Lab Results


Lab results reviewed: Yes


Lab and Imaging Results: 


no elevated WBC nor anemia








Impression and Recommendations





- Palliative Care


Impression: 


This is a zen 72-year-old woman with progressive supranuclear palsy, 

continuing to have functional decline, with more symptoms of cognitive decline 

this visit.  He just finished treatment for UTI, she was treated for pneumonia 

in December, she has had just a few pound weight loss over the last few months.

  She is maintained with tube feedings.  She still perceives her quality of 

life is acceptable, and her goals are to focus on comfort and quality of life 

issues.





Recommendations/Counseling Done: 


1.Progressive supranuclear palsy.  Patient is showing symptoms of decline, 

increased vision changes, increased fixed gaze, more upper extremity weakness, 

trunk instability, and difficulty with transfers.  She does have right arm 

contracture worsening.  Did follow up to add range of motion to her functional 

maintenance program, may need to reorder OT support.  Both patient and family 

recognize slow decline, continue to support weighing benefits and burdens as 

decisions come up to palliative care.


2.Dysphagia,Patient has a more weak cough effort, but no increase in choking.  

She is doing better on her continuous infusion for her tube feeding.  She is 

getting water flushes and remaining adequately hydrated.  She is remained 

fairly weight neutral.


3.  Bilateral shoulder pain.  She is on a very low dose of hydrocodone, we did 

discuss increasing her dosing, she reports no pain at rest only with transfers. 

Declined when offered to titrate up.


4.  Jaw contracture.  Patient unable to open jaw her mouth more than 1 cm, this 

is causing increased difficulty with oral care as well as she tends to bite her 

lip.  She does have some trauma to her lower lip, she is also "picking it".  

Will order a and D ointment 3 times a day to keep moist and discourage further 

picking.  Also will order speech therapy for evaluation and treat to see if she 

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


5.  Advanced care planning.  SARAH ST is in place, patient has decline since last 

seen with few changes.  With increased weakness, increased difficulty with 

transfers, weaker cough, and cognitive changes.  Patient's goals are to focus 

on quality of life issues, she does continue me amazingly resilient and 

positive in light of what she is facing.





Time Spent: 


Time spent 45 minutes with greater than 50% of this done in counseling and 

coronation of care with clinical staff family and support for grief and loss 

and anticipatory guidance with patient

## 2018-02-21 ENCOUNTER — HOSPITAL ENCOUNTER (OUTPATIENT)
Dept: HOSPITAL 76 - PC | Age: 73
Discharge: HOME | End: 2018-02-21
Attending: NURSE PRACTITIONER
Payer: MEDICARE

## 2018-02-21 ENCOUNTER — HOSPITAL ENCOUNTER (OUTPATIENT)
Dept: HOSPITAL 76 - LAB.R | Age: 73
Discharge: HOME | End: 2018-02-21
Attending: SKILLED NURSING FACILITY
Payer: MEDICARE

## 2018-02-21 DIAGNOSIS — R53.83: ICD-10-CM

## 2018-02-21 DIAGNOSIS — Z66: ICD-10-CM

## 2018-02-21 DIAGNOSIS — G23.1: ICD-10-CM

## 2018-02-21 DIAGNOSIS — M62.81: ICD-10-CM

## 2018-02-21 DIAGNOSIS — K59.03: ICD-10-CM

## 2018-02-21 DIAGNOSIS — R30.0: Primary | ICD-10-CM

## 2018-02-21 DIAGNOSIS — Z79.891: ICD-10-CM

## 2018-02-21 DIAGNOSIS — Z93.1: ICD-10-CM

## 2018-02-21 DIAGNOSIS — Z51.5: Primary | ICD-10-CM

## 2018-02-21 DIAGNOSIS — H40.9: ICD-10-CM

## 2018-02-21 DIAGNOSIS — N39.0: ICD-10-CM

## 2018-02-21 DIAGNOSIS — H66.92: ICD-10-CM

## 2018-02-21 DIAGNOSIS — E11.9: ICD-10-CM

## 2018-02-21 DIAGNOSIS — M27.8: ICD-10-CM

## 2018-02-21 DIAGNOSIS — T40.2X5D: ICD-10-CM

## 2018-02-21 DIAGNOSIS — R30.0: ICD-10-CM

## 2018-02-21 LAB
CLARITY UR REFRACT.AUTO: (no result)
CRYSTALS URNS QL MICRO: (no result) /LPF
GLUCOSE UR QL STRIP.AUTO: NEGATIVE MG/DL
KETONES UR QL STRIP.AUTO: NEGATIVE MG/DL
NITRITE UR QL STRIP.AUTO: POSITIVE
PH UR STRIP.AUTO: 8.5 PH (ref 5–7.5)
PROT UR STRIP.AUTO-MCNC: NEGATIVE MG/DL
RBC # UR STRIP.AUTO: NEGATIVE /UL
RBC # URNS HPF: (no result) /HPF (ref 0–5)
SP GR UR STRIP.AUTO: 1.01 (ref 1–1.03)
SQUAMOUS URNS QL MICRO: (no result)
UROBILINOGEN UR QL STRIP.AUTO: (no result) E.U./DL
UROBILINOGEN UR STRIP.AUTO-MCNC: NEGATIVE MG/DL

## 2018-02-21 PROCEDURE — 87086 URINE CULTURE/COLONY COUNT: CPT

## 2018-02-21 PROCEDURE — 81003 URINALYSIS AUTO W/O SCOPE: CPT

## 2018-02-21 PROCEDURE — 81001 URINALYSIS AUTO W/SCOPE: CPT

## 2018-02-21 PROCEDURE — 99310 SBSQ NF CARE HIGH MDM 45: CPT

## 2018-02-21 NOTE — CONSULTATION NOTE
Palliative Care Follow Up





- Referral


Referring Provider: Dr. Kaykay Fair


Time of Visit: 1512-5907


Referral setting: Skilled Nursing Facility


Referral Reason: Dysuria/Ear Pain





- Information Sources


Records reviewed: RN notes reviewed, Previous records reviewed


History/Review of Systems obtained from: Patient, Caregiver (clinical staff)


Exam limitations: Clinical condition (patient with more difficulty communicating

; some STM issues)





- History of Present Illness Update


Brief HPI Update: 


  This is a zen 72-year-old woman with progressive supranuclear palsy, 

currently at the nursing home secondary to the need for support for tube 

feedings.  She has continued to have decline, with increased vision changes, 

fatigue, more contractures and less control over her movements, more balance 

problems, and increasing weakness.  She does present with somewhat of a locked 

jaw, and today acutely with left ear pain.  She also is complaining of 

increased pain and burning with urination though she has just recently 

completed antibiotics on 2/1.  She had refused a cath urine yesterday, as this 

is uncomfortable for her, she did agree for Dayana the nurse and I to do it 

today.





On exam patient does have a significant amount of cerumen, in both ears.  Left 

ear the tympanic membrane though difficult to visualize, is with erythema and 

very painful on exam. Patient reports increasing pain over the last 2-3 days, 

and tenderness even to touch on the external pressure of her earlobe.  She 

reports she feels fluish and has an "ear infection". Very tearful relating to 

discomfort.








Patient also reports dysuria, both at beginning and end of stream.  Reports 

this started in the last 2448 hrs. as well, does not feel it is candidiasis.  

Did attempt 2 to get a cathed specimen, patient does not have signs or 

symptoms of candidiasis, but is with swelling and tenderness in the vaginal 

folds and around the urethra.  Did clean with Betadine, and collect a clean-

catch specimen.  Patient just finished antibiotic on 2/1.  Patient has not 

shown symptoms of urinary retention, but does report she often holds her urine 

for long periods of time, and is intermittently incontinent when unable to get 

up to the bathroom





Also communication from her sister Chidi regarding patient's increased 

limitation of opening her jaw, did speak with speech therapy has not evaluated 

her yet.  She was wondering about having her seen by the dentist, though 

patient with limited ability for teeth cleaning with be good for her gum health

, as well as follow-up on her glaucoma.  At this point in time patient has been 

able use paratransit, so this will still facilitate outside visits in weighing 

benefits and burdens of moving forward with patient attending outside 

appointments.








Social History





- Living Situation


Living arrangement: Nursing home


Support System: 


Patient has Teo, paid caregiver who comes regularly to help her with hygiene 

needs and socialization.  Chidi oversees her care and visits 2-3 times a week 

as well as well as advocates for her medical needs








Medications/Allergies





- Medications


Home Medications: 


 Ambulatory Orders











 Medication  Instructions  Recorded  Confirmed


 


Losartan [Cozaar] 12.5 mg PEG DAILY 09/24/13 02/21/18


 


Brimonidine 0.2% Ophth Drops 1 drops EACHEYE BID 03/05/17 02/21/18





[Alphagan P 0.2% Ophth Drops]   


 


Dorzolamide 2% Ophth Drops 1 drops EACHEYE BID 03/05/17 02/21/18





[Trusopt 2% Ophth Drops]   


 


Latanoprost 0.005% Ophth Drops 1 drops EACHEYE DAILY 03/05/17 02/21/18





[Xalatan Ophth Drops]   


 


Acetaminophen 500 mg PEG Q6HR PRN 07/19/17 02/21/18


 


Bisacodyl Supp [Dulcolax Supp] 10 mg NV DAILY PRN 07/19/17 02/21/18


 


Escitalopram Oxalate 10 mg PEG DAILY 07/19/17 02/21/18


 


HYDROcod/ACETAM 5/325 [Norco 5/325] 2.5 mg PEG Q8HR PRN 07/19/17 02/21/18


 


Loperamide HCl [Loperamide] 10 ml PEG Q6HR PRN 07/19/17 02/21/18


 


Multivitamin W/Minerals [Theragran 10 ml PEG DAILY 07/19/17 02/21/18





M]   


 


Polyethylene Glycol 3350 [Miralax] 17 gm PEG DAILY 07/19/17 02/21/18


 


guaiFENesin/DEXTROMETHORPHAN 10 ml PEG Q6HR PRN 07/19/17 02/21/18





[Robitussin Dm]   


 


HYDROcod/ACETAM 5/325 [Norco 5/325] 2.5 mg PEG Q8HR PRN 09/13/17 02/21/18


 


Ondansetron [Zuplenz] 8 mg PEG Q8HR PRN 09/13/17 02/21/18


 


Senna [Senokot] 8.6 mg PEG .EVERY OTHER DAY 11/05/17 02/21/18


 


Omeprazole Magnesium [Prilosec] 20 mg PEG BID 12/14/17 02/21/18


 


Amoxicillin 1,000 mg PEG Q8HR MDD for 5 days 02/21/18 02/21/18


 


Carbamide Peroxide Otic Drop 2 drops EACHEAR .BID FOR 4 DAYS 02/21/18 02/21/18





[Debrox Otic Drops] MDD to start after AB  














- Allergies


Allergies/Adverse Reactions: 


 Allergies











Allergy/AdvReac Type Severity Reaction Status Date / Time


 


Iodinated Contrast- Oral and Allergy Severe Respiratory Verified 11/28/17 12:35





IV Dye     





[Iodinated Contrast Media -     





IV Dye]     


 


bee pollen [Bee Pollen] Allergy  Hives Verified 11/28/17 12:35


 


morphine AdvReac Intermediate Hallucinati Verified 11/28/17 12:35





   ons  














Review of Systems





- Constitutional


Constitutional: reports: Fatigue, Weight loss (2/6 146.7)





- Eyes


Eyes: reports: Vision loss, Other (feels worsening; discussed with sister f/up 

with eye doctor)





- Ears, Nose & Throat


Ears, Nose & Throat: reports: Ear pain (severe left about 3 days), Other (tight 

jaw; limited ability to open mouth)





- Cardiovascular


Cardiovascular: denies: Chest pain





- Respiratory


Respiratory: reports: Cough (at times with secretions; no increase or noted 

recent episodes)





- Gastrointestinal


Gastrointestinal: denies: Nausea, Reflux/heartburn





- Genitourinary


Genitourinary: reports: Dysuria, Incontinence (if unable to get assist to BR)





- Musculoskeletal


Musculoskeletal: reports: Muscle pain, Muscle aches, Stiffness, Limited range 

of motion (right shoulder; contractures noted elbows), Muscle weakness, 

Transfer issues (patient with more extension; difficulty bending when transfered

)





- Integumentary


Integumentary: reports: Dryness





- Neurological


Neurological: reports: General weakness, Memory problems, Other (speech much 

slower)





- Psychiatric


Psychiatric: reports: Anxiety (related to ear pain), Delusions.  denies: 

Depression





- Endocrine


Endocrine: reports: Diabetes type 2 (blood sugars 3 X a week good)





- Hematologic/Lymphatic


Hematologic/Lymphatic: reports: Recurrent infections (just finished AB 2/1 for 

UTI)





- All Other Systems


All Other Systems: reports: Reviewed and negative





Physical Exam





- Vital Signs


Temperature: 97.2 C


Pulse Rate: 72


Respiratory Rate: 18





- Physical Exam


General Appearance: positive: Mild distress (related to ear pain; and concern 

with catheter specimen)


Eyes Bilateral: positive: Other (conjuntivae with some redness right greater 

than left)


ENT: positive: Other (continues with limited ROM; increase difficulty with oral 

care)


Neck: positive: Trachea midline, Stiff neck (hyperextension of neck worsening)


Cardiovascular: positive: Regular rate & rhythm


Respiratory: positive: Breath sounds nml


Abdomen: positive: Non-tender, Soft, Nml bowel sounds


Skin: positive: Other (peg exit site good)


Extremities: positive: No pedal edema


Neurologic/Psychiatric: positive: Mood/affect nml, Disoriented to time, Weakness





Palliative Care





- POLST


Patient has POLST: Yes


POLST Status: DNR, Selective Treatment


Pain: Comment (New acute pain of left ear, patient reports shoulder pain no 

better no worse.  Does not want to adjust her current pain medications.)


Tiredness/Fatigue: Moderate (4-6)


Drowsiness/Sedation: Moderate (4-6)


Nausea: None


Depression: None


Anxiety: Moderate (4-6) (related to ear pain)


Dyspnea: None


Anorexia: None


Sleep: Sleeps well


Constipation: Yes, Opoid induced, Managed


Feelings of wellbeing/Perceived Quality of Life: Fair, Acceptable


Performance Status: 


Patient with increasing difficulty with pivot transfers, some of this is her 

hyperextension, fear of falling, and increased weakness.  She is dependent for 

all ADLs, does like to sit in the hallway in her wheelchair to decrease her 

social isolation.








- Palliative Care


Discussion: 


Patient continues to be more difficult to understand, her speech is also 

slowed. Patient continues to approach her quality of life In the sequela of her 

disease in the pragmatic way.  She does perceive herself as deteriorating, but 

is not distressed or depressed regarding this.  She very much enjoys the 

attention staff provide her, and feels supported both by her family and friends.








Results





- Lab Results


Lab and Imaging Results: 





 Lab Results x24hrs











  02/21/18 Range/Units





  13:30 


 


Urine Color  YELLOW  


 


Urine Clarity  CLOUDY  (CLEAR)  


 


Urine pH  8.5 H  (5.0-7.5)  PH


 


Ur Specific Gravity  1.015  (1.002-1.030)  


 


Urine Protein  NEGATIVE  (NEGATIVE)  mg/dL


 


Urine Glucose (UA)  NEGATIVE  (NEGATIVE)  mg/dL


 


Urine Ketones  NEGATIVE  (NEGATIVE)  mg/dL


 


Urine Occult Blood  NEGATIVE  (NEGATIVE)  


 


Urine Nitrite  POSITIVE H  (NEGATIVE)  


 


Urine Bilirubin  NEGATIVE  (NEGATIVE)  


 


Urine Urobilinogen  0.2 (NORMAL)  (NORMAL)  E.U./dL


 


Ur Leukocyte Esterase  LARGE H  (NEGATIVE)  


 


Urine RBC  0-5  (0-5)  /HPF


 


Urine WBC  11-25 H  (0-5)  /HPF


 


Ur Squamous Epith Cells  RARE Squamous  (<= Few)  


 


Urine Crystals  11-25 Triple Phos  /LPF


 


Urine Bacteria  Few  (None Seen)  /HPF


 


Ur Microscopic Review  INDICATED  


 


Urine Culture Comments  INDICATED  














Impression and Recommendations





- Palliative Care


Impression: 


This is a zen 72-year-old woman with progressive supranuclear palsy, 

continuing to have slow functional and cognitive decline.  She presents today 

though with acute otitis media in her left ear, as well as recurrent symptoms 

of UTI.  She continues to perceive her quality of life is acceptable, and her 

goals are to focus on comfort and quality of life issues.  Palliative care to 

continue to provide support in meeting these goals.  Will transition to hospice 

when appropriate.





Recommendations/Counseling Done: 


1. Acute otitis media of left ear.  Patient is quite symptomatic, also has 

packed cerumen in both ears. Will treat with Amoxicillin 1 gm every 8 hours for 

5 days. After treatment will have nurses use debrox for 4 days and then warm 

water flushing.


2. Dysuria.  Attempted to get cath specimen, unable to without excessive 

trauma.  Did obtain clean-catch urine specimen, which already is positive.  

Will wait for C&S Results, is currently on amoxicillin, without systemic 

symptoms.  May consider adding D-mannose if organism E. Coli.


3. PSP.  Revisited with speech therapy regarding evaluation if any 

interventions available.  Sister will follow-up with eye doctor regarding 

update on glaucoma, it has been greater than a year.  Patient has had no 

increase in choking, is tolerating continuous infusion with break during the day

, she reports this is acceptable.


5.  Advanced care planning.  SARAH ST in place, patient continues with slow 

decline, unfortunately continues also with recurrent infections.  Patient's 

goals remain to focus on quality of life issues, continue to provide support at 

nursing home.





Time Spent: 


45 minutes with greater than 50% of this done in counseling coordination of 

care with clinical staff follow-up with sister.

## 2018-02-21 NOTE — CONSULTATION NOTE
Palliative Care Follow Up





- Referral


Referring Provider: Dr. Kaykay Fair


Time of Visit: 7798-7724


Referral setting: Skilled Nursing Facility


Referral Reason: Dysuria/Ear Pain





- Information Sources


Records reviewed: RN notes reviewed, Previous records reviewed


History/Review of Systems obtained from: Patient, Caregiver (clinical staff)


Exam limitations: Clinical condition (patient with more difficulty communicating

; some STM issues)





- History of Present Illness Update


Brief HPI Update: 


  This is a zen 72-year-old woman with progressive supranuclear palsy, 

currently at the nursing home secondary to the need for support for tube 

feedings.  She has continued to have decline, with increased vision changes, 

fatigue, more contractures and less control over her movements, more balance 

problems, and increasing weakness.  She does present with somewhat of a locked 

jaw, and today acutely with left ear pain.  She also is complaining of 

increased pain and burning with urination though she has just recently 

completed antibiotics on 2/1.  She had refused a cath urine yesterday, as this 

is uncomfortable for her, she did agree for Dayana the nurse and I to do it 

today.





On exam patient does have a significant amount of cerumen, in both ears.  Left 

ear the tympanic membrane though difficult to visualize, is with erythema and 

very painful on exam. Patient reports increasing pain over the last 2-3 days, 

and tenderness even to touch on the external pressure of her earlobe.  She 

reports she feels fluish and has an "ear infection". Very tearful relating to 

discomfort.








Patient also reports dysuria, both at beginning and end of stream.  Reports 

this started in the last 2448 hrs. as well, does not feel it is candidiasis.  

Did attempt 2 to get a cathed specimen, patient does not have signs or 

symptoms of candidiasis, but is with swelling and tenderness in the vaginal 

folds and around the urethra.  Did clean with Betadine, and collect a clean-

catch specimen.  Patient just finished antibiotic on 2/1.  Patient has not 

shown symptoms of urinary retention, but does report she often holds her urine 

for long periods of time, and is intermittently incontinent when unable to get 

up to the bathroom





Also communication from her sister Chidi regarding patient's increased 

limitation of opening her jaw, did speak with speech therapy has not evaluated 

her yet.  She was wondering about having her seen by the dentist, though 

patient with limited ability for teeth cleaning with be good for her gum health

, as well as follow-up on her glaucoma.  At this point in time patient has been 

able use paratransit, so this will still facilitate outside visits in weighing 

benefits and burdens of moving forward with patient attending outside 

appointments.








Social History





- Living Situation


Living arrangement: Nursing home


Support System: 


Patient has Teo, paid caregiver who comes regularly to help her with hygiene 

needs and socialization.  Chidi oversees her care and visits 2-3 times a week 

as well as well as advocates for her medical needs








Medications/Allergies





- Medications


Home Medications: 


 Ambulatory Orders











 Medication  Instructions  Recorded  Confirmed


 


Losartan [Cozaar] 12.5 mg PEG DAILY 09/24/13 02/21/18


 


Brimonidine 0.2% Ophth Drops 1 drops EACHEYE BID 03/05/17 02/21/18





[Alphagan P 0.2% Ophth Drops]   


 


Dorzolamide 2% Ophth Drops 1 drops EACHEYE BID 03/05/17 02/21/18





[Trusopt 2% Ophth Drops]   


 


Latanoprost 0.005% Ophth Drops 1 drops EACHEYE DAILY 03/05/17 02/21/18





[Xalatan Ophth Drops]   


 


Acetaminophen 500 mg PEG Q6HR PRN 07/19/17 02/21/18


 


Bisacodyl Supp [Dulcolax Supp] 10 mg SD DAILY PRN 07/19/17 02/21/18


 


Escitalopram Oxalate 10 mg PEG DAILY 07/19/17 02/21/18


 


HYDROcod/ACETAM 5/325 [Norco 5/325] 2.5 mg PEG Q8HR PRN 07/19/17 02/21/18


 


Loperamide HCl [Loperamide] 10 ml PEG Q6HR PRN 07/19/17 02/21/18


 


Multivitamin W/Minerals [Theragran 10 ml PEG DAILY 07/19/17 02/21/18





M]   


 


Polyethylene Glycol 3350 [Miralax] 17 gm PEG DAILY 07/19/17 02/21/18


 


guaiFENesin/DEXTROMETHORPHAN 10 ml PEG Q6HR PRN 07/19/17 02/21/18





[Robitussin Dm]   


 


HYDROcod/ACETAM 5/325 [Norco 5/325] 2.5 mg PEG Q8HR PRN 09/13/17 02/21/18


 


Ondansetron [Zuplenz] 8 mg PEG Q8HR PRN 09/13/17 02/21/18


 


Senna [Senokot] 8.6 mg PEG .EVERY OTHER DAY 11/05/17 02/21/18


 


Omeprazole Magnesium [Prilosec] 20 mg PEG BID 12/14/17 02/21/18


 


Amoxicillin 1,000 mg PEG Q8HR MDD for 5 days 02/21/18 02/21/18


 


Carbamide Peroxide Otic Drop 2 drops EACHEAR .BID FOR 4 DAYS 02/21/18 02/21/18





[Debrox Otic Drops] MDD to start after AB  














- Allergies


Allergies/Adverse Reactions: 


 Allergies











Allergy/AdvReac Type Severity Reaction Status Date / Time


 


Iodinated Contrast- Oral and Allergy Severe Respiratory Verified 11/28/17 12:35





IV Dye     





[Iodinated Contrast Media -     





IV Dye]     


 


bee pollen [Bee Pollen] Allergy  Hives Verified 11/28/17 12:35


 


morphine AdvReac Intermediate Hallucinati Verified 11/28/17 12:35





   ons  














Review of Systems





- Constitutional


Constitutional: reports: Fatigue, Weight loss (2/6 146.7)





- Eyes


Eyes: reports: Vision loss, Other (feels worsening; discussed with sister f/up 

with eye doctor)





- Ears, Nose & Throat


Ears, Nose & Throat: reports: Ear pain (severe left about 3 days), Other (tight 

jaw; limited ability to open mouth)





- Cardiovascular


Cardiovascular: denies: Chest pain





- Respiratory


Respiratory: reports: Cough (at times with secretions; no increase or noted 

recent episodes)





- Gastrointestinal


Gastrointestinal: denies: Nausea, Reflux/heartburn





- Genitourinary


Genitourinary: reports: Dysuria, Incontinence (if unable to get assist to BR)





- Musculoskeletal


Musculoskeletal: reports: Muscle pain, Muscle aches, Stiffness, Limited range 

of motion (right shoulder; contractures noted elbows), Muscle weakness, 

Transfer issues (patient with more extension; difficulty bending when transfered

)





- Integumentary


Integumentary: reports: Dryness





- Neurological


Neurological: reports: General weakness, Memory problems, Other (speech much 

slower)





- Psychiatric


Psychiatric: reports: Anxiety (related to ear pain), Delusions.  denies: 

Depression





- Endocrine


Endocrine: reports: Diabetes type 2 (blood sugars 3 X a week good)





- Hematologic/Lymphatic


Hematologic/Lymphatic: reports: Recurrent infections (just finished AB 2/1 for 

UTI)





- All Other Systems


All Other Systems: reports: Reviewed and negative





Physical Exam





- Vital Signs


Temperature: 97.2 C


Pulse Rate: 72


Respiratory Rate: 18





- Physical Exam


General Appearance: positive: Mild distress (related to ear pain; and concern 

with catheter specimen)


Eyes Bilateral: positive: Other (conjuntivae with some redness right greater 

than left)


ENT: positive: Other (continues with limited ROM; increase difficulty with oral 

care)


Neck: positive: Trachea midline, Stiff neck (hyperextension of neck worsening)


Cardiovascular: positive: Regular rate & rhythm


Respiratory: positive: Breath sounds nml


Abdomen: positive: Non-tender, Soft, Nml bowel sounds


Skin: positive: Other (peg exit site good)


Extremities: positive: No pedal edema


Neurologic/Psychiatric: positive: Mood/affect nml, Disoriented to time, Weakness





Palliative Care





- POLST


Patient has POLST: Yes


POLST Status: DNR, Selective Treatment


Pain: Comment (New acute pain of left ear, patient reports shoulder pain no 

better no worse.  Does not want to adjust her current pain medications.)


Tiredness/Fatigue: Moderate (4-6)


Drowsiness/Sedation: Moderate (4-6)


Nausea: None


Depression: None


Anxiety: Moderate (4-6) (related to ear pain)


Dyspnea: None


Anorexia: None


Sleep: Sleeps well


Constipation: Yes, Opoid induced, Managed


Feelings of wellbeing/Perceived Quality of Life: Fair, Acceptable


Performance Status: 


Patient with increasing difficulty with pivot transfers, some of this is her 

hyperextension, fear of falling, and increased weakness.  She is dependent for 

all ADLs, does like to sit in the hallway in her wheelchair to decrease her 

social isolation.








- Palliative Care


Discussion: 


Patient continues to be more difficult to understand, her speech is also 

slowed. Patient continues to approach her quality of life In the sequela of her 

disease in the pragmatic way.  She does perceive herself as deteriorating, but 

is not distressed or depressed regarding this.  She very much enjoys the 

attention staff provide her, and feels supported both by her family and friends.








Results





- Lab Results


Lab and Imaging Results: 





 Lab Results x24hrs











  02/21/18 Range/Units





  13:30 


 


Urine Color  YELLOW  


 


Urine Clarity  CLOUDY  (CLEAR)  


 


Urine pH  8.5 H  (5.0-7.5)  PH


 


Ur Specific Gravity  1.015  (1.002-1.030)  


 


Urine Protein  NEGATIVE  (NEGATIVE)  mg/dL


 


Urine Glucose (UA)  NEGATIVE  (NEGATIVE)  mg/dL


 


Urine Ketones  NEGATIVE  (NEGATIVE)  mg/dL


 


Urine Occult Blood  NEGATIVE  (NEGATIVE)  


 


Urine Nitrite  POSITIVE H  (NEGATIVE)  


 


Urine Bilirubin  NEGATIVE  (NEGATIVE)  


 


Urine Urobilinogen  0.2 (NORMAL)  (NORMAL)  E.U./dL


 


Ur Leukocyte Esterase  LARGE H  (NEGATIVE)  


 


Urine RBC  0-5  (0-5)  /HPF


 


Urine WBC  11-25 H  (0-5)  /HPF


 


Ur Squamous Epith Cells  RARE Squamous  (<= Few)  


 


Urine Crystals  11-25 Triple Phos  /LPF


 


Urine Bacteria  Few  (None Seen)  /HPF


 


Ur Microscopic Review  INDICATED  


 


Urine Culture Comments  INDICATED  














Impression and Recommendations





- Palliative Care


Impression: 


This is a zen 72-year-old woman with progressive supranuclear palsy, 

continuing to have slow functional and cognitive decline.  She presents today 

though with acute otitis media in her left ear, as well as recurrent symptoms 

of UTI.  She continues to perceive her quality of life is acceptable, and her 

goals are to focus on comfort and quality of life issues.  Palliative care to 

continue to provide support in meeting these goals.  Will transition to hospice 

when appropriate.





Recommendations/Counseling Done: 


1. Acute otitis media of left ear.  Patient is quite symptomatic, also has 

packed cerumen in both ears. Will treat with Amoxicillin 1 gm every 8 hours for 

5 days. After treatment will have nurses use debrox for 4 days and then warm 

water flushing.


2. Dysuria.  Attempted to get cath specimen, unable to without excessive 

trauma.  Did obtain clean-catch urine specimen, which already is positive.  

Will wait for C&S Results, is currently on amoxicillin, without systemic 

symptoms.  May consider adding D-mannose if organism E. Coli.


3. PSP.  Revisited with speech therapy regarding evaluation if any 

interventions available.  Sister will follow-up with eye doctor regarding 

update on glaucoma, it has been greater than a year.  Patient has had no 

increase in choking, is tolerating continuous infusion with break during the day

, she reports this is acceptable.


5.  Advanced care planning.  SARAH ST in place, patient continues with slow 

decline, unfortunately continues also with recurrent infections.  Patient's 

goals remain to focus on quality of life issues, continue to provide support at 

nursing home.





Time Spent: 


45 minutes with greater than 50% of this done in counseling coordination of 

care with clinical staff follow-up with sister.

## 2018-03-19 ENCOUNTER — HOSPITAL ENCOUNTER (OUTPATIENT)
Dept: HOSPITAL 76 - PC | Age: 73
Discharge: HOME | End: 2018-03-19
Attending: NURSE PRACTITIONER
Payer: MEDICARE

## 2018-03-19 DIAGNOSIS — Z66: ICD-10-CM

## 2018-03-19 DIAGNOSIS — Z93.1: ICD-10-CM

## 2018-03-19 DIAGNOSIS — F32.9: ICD-10-CM

## 2018-03-19 DIAGNOSIS — F41.9: ICD-10-CM

## 2018-03-19 DIAGNOSIS — Z51.5: Primary | ICD-10-CM

## 2018-03-19 DIAGNOSIS — Z87.440: ICD-10-CM

## 2018-03-19 DIAGNOSIS — H61.22: ICD-10-CM

## 2018-03-19 DIAGNOSIS — G23.1: ICD-10-CM

## 2018-03-19 DIAGNOSIS — E11.9: ICD-10-CM

## 2018-03-19 PROCEDURE — 99310 SBSQ NF CARE HIGH MDM 45: CPT

## 2018-03-19 NOTE — CONSULTATION NOTE
Palliative Care Follow Up





- Referral


Referring Provider: Dr. Kaykay Alas


Time of Visit: 8035-4339


Referral setting: Skilled Nursing Facility


Referral Reason: PSP





- Information Sources


Records reviewed: RN notes reviewed, Previous records reviewed


History/Review of Systems obtained from: Patient, Caregiver (Nursing Home Staff)


Exam limitations: Clinical condition (Patient with increased difficulty 

understanding her speech, does have short-term memory issues.)





- History of Present Illness Update


Brief HPI Update: 


This is a zen 72-year-old woman with progressive supranuclear palsy, 

currently at the nursing home, secondary to increased care needs and support 

for her tube feedings.  She continues to have decline with increased vision 

changes, spending more time in bed with fatigue, contractures, less control 

over her movements, and increasing weakness.  She does present with somewhat of 

contracted locked jaw, and more difficulty with communicating today.  This was 

confirmed by her sister, is having increased difficulty over the last couple 

weeks as well.  She has remained weight neutral at 147.2, was treated for her 

recurrent UTIs at the end of February, also had an acute left ear infection.





She does complain still of some mild tenderness, and review of nursing records, 

patient did receive antibiotics and Debrox, but does not appear to have had the 

flushing done.  On examination she still has significant amount of cerumen in 

left ear, right eardrum visualized with to small amount.  No erythema.








Social History





- Living Situation


Living arrangement: Nursing home


Support System: 


Sister Chidi oversees her care and visit several times a week, patient has a 

paid caregiver Teo who comes regularly to help her with hygiene and 

socialization as well.








Medications/Allergies





- Medications


Home Medications: 


 Ambulatory Orders











 Medication  Instructions  Recorded  Confirmed


 


Losartan [Cozaar] 12.5 mg PEG DAILY 09/24/13 03/19/18


 


Brimonidine 0.2% Ophth Drops 1 drops EACHEYE BID 03/05/17 03/19/18





[Alphagan P 0.2% Ophth Drops]   


 


Dorzolamide 2% Ophth Drops 1 drops EACHEYE BID 03/05/17 03/19/18





[Trusopt 2% Ophth Drops]   


 


Latanoprost 0.005% Ophth Drops 1 drops EACHEYE DAILY 03/05/17 03/19/18





[Xalatan Ophth Drops]   


 


Acetaminophen 500 mg PEG Q6HR PRN 07/19/17 03/19/18


 


Bisacodyl Supp [Dulcolax Supp] 10 mg [place] DAILY PRN 07/19/17 03/19/18


 


Escitalopram Oxalate 10 mg PEG DAILY 07/19/17 03/19/18


 


HYDROcod/ACETAM 5/325 [Norco 5/325] 2.5 mg PEG Q8HR PRN 07/19/17 03/19/18


 


Loperamide HCl [Loperamide] 10 ml PEG Q6HR PRN 07/19/17 03/19/18


 


Multivitamin W/Minerals [Theragran 10 ml PEG DAILY 07/19/17 03/19/18





M]   


 


Polyethylene Glycol 3350 [Miralax] 17 gm PEG DAILY 07/19/17 03/19/18


 


guaiFENesin/DEXTROMETHORPHAN 10 ml PEG Q6HR PRN 07/19/17 03/19/18





[Robitussin Dm]   


 


HYDROcod/ACETAM 5/325 [Norco 5/325] 2.5 mg PEG Q8HR PRN 09/13/17 03/19/18


 


Ondansetron [Zuplenz] 8 mg PEG Q8HR PRN 09/13/17 03/19/18


 


Senna [Senokot] 8.6 mg PEG .EVERY OTHER DAY 11/05/17 03/19/18


 


Omeprazole Magnesium [Prilosec] 20 mg PEG BID 12/14/17 03/19/18


 


Carbamide Peroxide Otic Drop 2 drops EACHEAR .BID FOR 4 DAYS 02/21/18 03/19/18





[Debrox Otic Drops]   














- Allergies


Allergies/Adverse Reactions: 


 Allergies











Allergy/AdvReac Type Severity Reaction Status Date / Time


 


Iodinated Contrast- Oral and Allergy Severe Respiratory Verified 11/28/17 12:35





IV Dye     





[Iodinated Contrast Media -     





IV Dye]     


 


bee pollen [Bee Pollen] Allergy  Hives Verified 11/28/17 12:35


 


morphine AdvReac Intermediate Hallucinati Verified 11/28/17 12:35





   ons  














Review of Systems





- Constitutional


Constitutional: reports: Fatigue, Weight stable (147.2)





- Ears, Nose & Throat


Ears, Nose & Throat: reports: Ear pain (slight tenderness on exam but much 

improved), Dry mouth





- Cardiovascular


Cardiovascular: denies: Chest pain





- Respiratory


Respiratory: reports: Cough (intermittent but denies choking episodes worsening)





- Gastrointestinal


Gastrointestinal: denies: Constipation, Diarrhea, Nausea, Reflux/heartburn





- Genitourinary


Genitourinary: reports: Incontinence.  denies: Dysuria





- Musculoskeletal


Musculoskeletal: reports: Stiffness, Limited range of motion (upper extremities 

with contractures; right greater than left), Transfer issues (uses wheelchair; 

can walk few steps but poor balance and tips back)





- Integumentary


Integumentary: reports: Dryness





- Neurological


Neurological: reports: General weakness, Memory problems, Slurred speech (more 

difficult to understand and she reports more difficulty in speaking)





- Psychiatric


Psychiatric: denies: Depression, Hallucinations





- Endocrine


Endocrine: reports: Diabetes type 2 (blood sugars in good range)





- Hematologic/Lymphatic


Hematologic/Lymphatic: reports: Recurrent infections (UTIs)





- All Other Systems


All Other Systems: reports: Reviewed and negative





Physical Exam





- Vital Signs


Temperature: 97.9 C


Pulse Rate: 83


Respiratory Rate: 18


O2 Saturation: 92 (ra @ rest)


Blood Pressure: 132/68





- Physical Exam


General Appearance: positive: No acute distress, Alert


Eyes Bilateral: positive: Other (difficulty fixing gaze; watery on exam; no 

redness)


ENT: positive: Other (jaw able to open less than 1 cm space fron teeth; 

difficult for oral exam;)


Neck: positive: Other (hyperextension of neck; more contracted)


Cardiovascular: positive: Regular rate & rhythm


Respiratory: positive: Diminished throughout, Other (difficulty taking deep 

breath)


Abdomen: positive: Non-tender, Soft, Nml bowel sounds, Other (PEG exit site 

without signs/symptoms of infections)


Skin: positive: Pallor, Dryness.  negative: Pressure wound


Extremities: positive: No pedal edema, Other (contractures upper extremities 

right greater than left; limited ROM shoulders)


Neurologic/Psychiatric: positive: Disoriented to place, Disoriented to time, 

Slurred/abnml speech (much less air forced movement to be able to speak).  

negative: Depressed mood/affect





Palliative Care





- POLST


Patient has POLST: Yes


POLST Status: DNR, Selective Treatment


Pain: Pain unchanged, Location (bilateral shoulder pain; declines need for 

adjustment;)


Tiredness/Fatigue: Moderate (4-6)


Feelings of wellbeing/Perceived Quality of Life: Fair, Acceptable, No change


Performance Status: 


Patient dependent for all ADLs, transfers, and toileting.  She does receive 

bathing twice a week, has been spending more time in bed, she reports this is 

related to fatigue as well as more comfortable laying down then being up.








- Palliative Care


Discussion: 


Reviewed patient's current perception of quality life, she does still find hitesh 

and satisfaction with visiting with family, friends, and staff.  She is stable 

to get up in her wheelchair, does interact with those around her as best she 

can. She denies any fears or worries, concerns about the future, discussed 

given she was losing some of her communication if okay to talk about when 

quality of life would not be acceptable.  This was difficult given her decline 

in voice quality, and ability enunciate words.  Did reconfirm, it would be when 

she is unable to enjoy her company, if she were in any increased distress or 

pain, or no longer to be able to get up.  She does enjoy her babatunde, visits from 

her friend Dexter, and time with her sister.  She denies any symptoms of 

increased depression or distress.  She is still having increased difficulty 

making her needs known








Impression and Recommendations





- Palliative Care


Impression: 


This is a zen 72-year-old woman with progressive supranuclear palsy, 

continued to have slow functional and cognitive decline.  She presents today 

with increased difficulty with communication, increased fatigue and time spent 

in bed, but remains satisfied with her current quality of life.  Palliative 

care to continue provide support, will transition to hospice when appropriate





Recommendations/Counseling Done: 


1. Cerumen impaction.Acute otitis media of left ear appears resolved, no has 

not had ears flushed and cerumen still present.  We ordered Debrox and water 

flushing.


2.  PSP.  Patient presents with ongoing decline, both functional and cognitive.

  Now with increased difficulty with communication. Follow-up with speech 

therapy, is working on clench jaw, will revisit and continue to focus on 

communication enhancements.  Patient with progressive disease and expected 

ongoing decline


4.  Advanced care planning.  Revisited goals with patient, current quality of 

life still acceptable.  SARAH ST in place.  Patient at high risk regarding 

sequela of frequent UTIs.  Patient goal remains to focus on quality of life 

issues, palliative care to continue provide support and nursing home 

environment until transition to hospice.





Time Spent: 


45 minutes with greater than 50% of this done in counseling for depression, 

goals of care, as well as coordination of care with clinical staff.

## 2018-04-23 ENCOUNTER — HOSPITAL ENCOUNTER (OUTPATIENT)
Dept: HOSPITAL 76 - PC | Age: 73
Discharge: HOME | End: 2018-04-23
Attending: NURSE PRACTITIONER
Payer: MEDICARE

## 2018-04-23 DIAGNOSIS — Z93.1: ICD-10-CM

## 2018-04-23 DIAGNOSIS — G89.29: ICD-10-CM

## 2018-04-23 DIAGNOSIS — G23.1: ICD-10-CM

## 2018-04-23 DIAGNOSIS — K59.03: ICD-10-CM

## 2018-04-23 DIAGNOSIS — M25.511: ICD-10-CM

## 2018-04-23 DIAGNOSIS — Z66: ICD-10-CM

## 2018-04-23 DIAGNOSIS — Z51.5: Primary | ICD-10-CM

## 2018-04-23 DIAGNOSIS — M25.512: ICD-10-CM

## 2018-04-23 DIAGNOSIS — T40.2X5A: ICD-10-CM

## 2018-04-23 DIAGNOSIS — Z79.891: ICD-10-CM

## 2018-04-23 PROCEDURE — 99309 SBSQ NF CARE MODERATE MDM 30: CPT

## 2018-04-23 NOTE — CONSULTATION NOTE
Palliative Care Follow Up





- Referral


Referring Provider: Dr. Cherrie Alas


Time of Visit: 0865-7651


Referral setting: Skilled Nursing Facility


Referral Reason: Progressive Nuclear Palsy





- Information Sources


Records reviewed: RN notes reviewed, Previous records reviewed


History/Review of Systems obtained from: Patient, Family (spoke with sister 

Chidi Euceda), Caregiver (Spoke with caregiver Teo)


Exam limitations: Clinical condition (some STM issues; speech getting more 

difficult to follow)





- History of Present Illness Update


Brief HPI Update: 


This is a zen 72-year-old woman with progressive supranuclear palsy, 

currently living at the nursing home, this is related to increased care needs 

and support for her tube feedings.  She does continue to have slow decline, 

increased vision changes, increased time in bed secondary to fatigue, 

progressive contractures, and more difficulty with communicating.  She 

continues to remain weight neutral no recent infections, reports her pain is 

well controlled on her current regimen with hydrocodone, this is mostly 

bilateral shoulder pain and some wrist pain.  She denies any further difficulty 

with her ears, denies depression, no identified concerns from her caregiver are 

sister.








Social History





- Living Situation


Living arrangement: Nursing home


Support System: 


Her sisters Chidi continues to visit her on a regular basis, she does have 

paid caregiving to supplement her current situation by Teo, she provides some 

personal care as well as socialization.  Her ex- continues to visit her 

on a regular basis, Dexter.  He does take her out from time to time.








Medications/Allergies





- Medications


Home Medications: 


 Ambulatory Orders











 Medication  Instructions  Recorded  Confirmed


 


Losartan [Cozaar] 12.5 mg PEG DAILY 09/24/13 04/23/18


 


Brimonidine 0.2% Ophth Drops 1 drops EACHEYE BID 03/05/17 04/23/18





[Alphagan P 0.2% Ophth Drops]   


 


Latanoprost 0.005% Ophth Drops 1 drops EACHEYE DAILY 03/05/17 04/23/18





[Xalatan Ophth Drops]   


 


Acetaminophen 500 mg PEG Q6HR PRN 07/19/17 04/23/18


 


Bisacodyl Supp [Dulcolax Supp] 10 mg [place] DAILY PRN 07/19/17 04/23/18


 


Escitalopram Oxalate 10 mg PEG DAILY 07/19/17 04/23/18


 


HYDROcod/ACETAM 5/325 [Norco 5/325] 2.5 mg PEG Q8HR PRN 07/19/17 04/23/18


 


Loperamide HCl [Loperamide] 10 ml PEG Q6HR PRN 07/19/17 04/23/18


 


Multivitamin W/Minerals [Theragran 10 ml PEG DAILY 07/19/17 04/23/18





M]   


 


Polyethylene Glycol 3350 [Miralax] 17 gm PEG DAILY 07/19/17 04/23/18


 


HYDROcod/ACETAM 5/325 [Norco 5/325] 2.5 mg PEG Q8HR PRN 09/13/17 04/23/18


 


Ondansetron [Zuplenz] 8 mg PEG Q8HR PRN 09/13/17 04/23/18


 


Senna [Senokot] 8.6 mg PEG .EVERY OTHER DAY 11/05/17 04/23/18


 


Omeprazole Magnesium [Prilosec] 20 mg PEG BID 12/14/17 04/23/18














- Allergies


Allergies/Adverse Reactions: 


 Allergies











Allergy/AdvReac Type Severity Reaction Status Date / Time


 


Iodinated Contrast- Oral and Allergy Severe Respiratory Verified 11/28/17 12:35





IV Dye     





[Iodinated Contrast Media -     





IV Dye]     


 


bee pollen [Bee Pollen] Allergy  Hives Verified 11/28/17 12:35


 


morphine AdvReac Intermediate Hallucinati Verified 11/28/17 12:35





   ons  














Review of Systems





- Constitutional


Constitutional: reports: Fatigue (patient reports more comfortable in bed; 

needing more rest periods), Weight stable (146.3 (10 pounds in 6 months, but no 

change since January))





- Eyes


Eyes: reports: Vision loss, Dipolpia, Corrective lenses





- Ears, Nose & Throat


Ears, Nose & Throat: reports: Dry mouth.  denies: Ear pain





- Cardiovascular


Cardiovascular: reports: Decr. exercise tolerance.  denies: Chest pain





- Respiratory


Respiratory: reports: Cough (intermittent but reports no difficulties managing 

oral secretions), SOB with exertion





- Gastrointestinal


Gastrointestinal: denies: Reflux/heartburn





- Genitourinary


Genitourinary: reports: Incontinence.  denies: Dysuria





- Musculoskeletal


Musculoskeletal: reports: Stiffness, Limited range of motion (wrist/shoulder), 

Transfer issues (up in wheelchair;pivot transfer with much cueing)





- Integumentary


Integumentary: reports: Dryness.  denies: Rash, Pruritis





- Neurological


Neurological: reports: Memory problems, Slurred speech (speech getting more 

difficult)





- Psychiatric


Psychiatric: reports: Hallucinations (occasional).  denies: Depression, Anxiety





- Endocrine


Endocrine: reports: Diabetes type 2 (blood sugars normal range)





- Hematologic/Lymphatic


Hematologic/Lymphatic: reports: Recurrent infections (hx of UTIs)





- All Other Systems


All Other Systems: reports: Reviewed and negative





Physical Exam





- Vital Signs


Temperature: 96.9 C


Pulse Rate: 59


Respiratory Rate: 18


O2 Saturation: 92 (ra @ rest)


Blood Pressure: 98/64





- Physical Exam


General Appearance: positive: No acute distress


Eyes Bilateral: positive: Normal inspection


ENT: positive: No signs of dehydration, Other (limited ability to open mouth).  

negative: Pharyngeal erythema, Oral lesions


Neck: positive: Stiff neck (hyperextended; unable to straighten)


Cardiovascular: positive: Regular rate & rhythm


Respiratory: positive: Diminished in bases.  negative: Wheezes, Rales, Rhonchi


Abdomen: positive: Non-tender, Soft, Nml bowel sounds, Other (PEG tube)


Skin: positive: Pallor


Extremities: positive: No pedal edema


Neurologic/Psychiatric: positive: Mood/affect nml, Disoriented to time





Palliative Care





- POLST


Patient has POLST: Yes


POLST Status: DNR, Selective Treatment


Pain: Pain unchanged, Location (bilateral shoulder pain controlled with low 

dose hydrocodone TID)


Tiredness/Fatigue: Moderate (4-6)


Drowsiness/Sedation: Mild (1-3)


Nausea: None


Depression: None


Anxiety: None


Dyspnea: None


Sleep: Sleeps well


Constipation: Yes, Opoid induced, Managed


Feelings of wellbeing/Perceived Quality of Life: Fair, Acceptable, No change


Performance Status: 


Patient dependent on tube feedings, for all ADLs and personal care as well as 

toileting.  Patient is spending more time in bed, some increased contractures, 

and decreased activity tolerance.  Does attribute some of this to increased 

fatigue and more comfortable laying down particularly on her neck.








- Palliative Care


Discussion: 


Patient reports she has no worries, she is denying any kind of depression.  She 

is more difficult to engage in conversation and more difficult for her to 

initiate questions.  She is more than willing to answer questions unclear if 

this is energy or having increased trouble with her short-term memory.  She 

still has a zen sense of humor, is getting visits from the palliative 

, denies any existential or current distress.








Impression and Recommendations





- Palliative Care


Impression: 


This is a zen 72-year-old woman with progressive supranuclear palsy, 

continued to have very slow functional and cognitive decline.  She does present 

today with increased difficulty with communication, increased fatigue, but 

remains satisfied with her current quality of life and no significant concerns.

  Palliative care to continue ongoing support to care team, until transition to 

hospice appropriate.





Recommendations/Counseling Done: 


1.  PSP.  Patient does have ongoing decline but this is slow both functional 

and cognitive.  Patient remains dependent on tube feedings, weight neutral, no 

recent infections, skin breakdown, or falls.


2.  Acute on chronic pain bilateral shoulders.  Does report some increased 

discomfort in wrists.  Given patient's history of severe GERD, I am hesitant to 

add any kind of anti-inflammatory.  Patient reports currently satisfied with 

current regimen, declines need for titration upward.


3.  Advanced care planning.  Patient currently satisfied with support from 

palliative care team, caregiving situation with sister and caregiver Teo, 

still continues to find quality of life, has a strong babatunde.  SARAH ST in place.  

Will continue to follow with palliative care every 6-8 weeks, unless problems 

arise.





Time Spent: 


30 minutes with greater than 50% of this done in counseling with patient 

regarding current symptom burden, quality of life issues, and coordination of 

care with staff and anticipatory guidance with sister.

## 2018-05-31 ENCOUNTER — HOSPITAL ENCOUNTER (OUTPATIENT)
Dept: HOSPITAL 76 - DI | Age: 73
Discharge: HOME | End: 2018-05-31
Attending: FAMILY MEDICINE
Payer: MEDICARE

## 2018-05-31 DIAGNOSIS — R19.00: Primary | ICD-10-CM

## 2018-05-31 DIAGNOSIS — K43.9: ICD-10-CM

## 2018-05-31 PROCEDURE — 74176 CT ABD & PELVIS W/O CONTRAST: CPT

## 2018-05-31 NOTE — CT REPORT
CT ABDOMEN AND PELVIS WITHOUT CONTRAST:  05/31/2018

 

CLINICAL INDICATION:  Abdominal mass.

 

TECHNIQUE:  Axial CT images of the abdomen and pelvis were obtained without intravenous 

contrast, due to the patient's contrast allergy.  Oral contrast was administered via the 

indwelling feeding tube.

 

COMPARISON:  No previous CT is available for comparison.

 

FINDINGS:  Limited evaluation of the lung bases is unremarkable.

 

ABDOMEN:  Feeding tube terminates in the stomach.  Allowing for the lack of intravenous 

contrast enhancement, the liver, spleen, pancreas and adrenal glands appear unremarkable.  The 

kidneys demonstrate cortical cysts.  The gallbladder is not dilated.  No bowel dilatation, free

gas, or free fluid is present.  No abdominal adenopathy is appreciated.

 

PELVIS:  There is a right spigelian hernia, containing nonobstructed loops of bowel.  

Postoperative changes are seen in the right lower quadrant.  No pelvic adenopathy or free fluid

is present.

 

Osseous structures demonstrate degenerative changes.

 

IMPRESSION:  RIGHT SPIGELIAN HERNIA, CONTAINING NONOBSTRUCTED LOOPS OF BOWEL.  NO OTHER EVIDENT

ABDOMINAL MASS IS APPRECIATED.

 

CT DOSE REDUCTION STATEMENT

 

In accordance with CT protocol optimization, one or more of the following dose reduction 

techniques were utilized for this exam:  automated exposure control, adjustment of mA and/or KV

based on patient size, or use of iterative reconstructive technique.

 

 

DD: 05/31/2018 13:26

TD: 05/31/2018 13:35

Job #: [id number]

## 2018-06-05 ENCOUNTER — HOSPITAL ENCOUNTER (OUTPATIENT)
Dept: HOSPITAL 76 - PC | Age: 73
Discharge: HOME | End: 2018-06-05
Attending: NURSE PRACTITIONER
Payer: MEDICARE

## 2018-06-05 DIAGNOSIS — Z66: ICD-10-CM

## 2018-06-05 DIAGNOSIS — G89.29: ICD-10-CM

## 2018-06-05 DIAGNOSIS — M24.532: ICD-10-CM

## 2018-06-05 DIAGNOSIS — Z51.5: Primary | ICD-10-CM

## 2018-06-05 DIAGNOSIS — Z96.89: ICD-10-CM

## 2018-06-05 DIAGNOSIS — M24.512: ICD-10-CM

## 2018-06-05 DIAGNOSIS — E11.9: ICD-10-CM

## 2018-06-05 DIAGNOSIS — M24.531: ICD-10-CM

## 2018-06-05 DIAGNOSIS — K43.9: ICD-10-CM

## 2018-06-05 DIAGNOSIS — Z91.81: ICD-10-CM

## 2018-06-05 DIAGNOSIS — M24.511: ICD-10-CM

## 2018-06-05 DIAGNOSIS — M25.512: ICD-10-CM

## 2018-06-05 DIAGNOSIS — M24.521: ICD-10-CM

## 2018-06-05 DIAGNOSIS — M25.511: ICD-10-CM

## 2018-06-05 DIAGNOSIS — G23.1: ICD-10-CM

## 2018-06-05 DIAGNOSIS — M24.522: ICD-10-CM

## 2018-06-05 PROCEDURE — 99310 SBSQ NF CARE HIGH MDM 45: CPT

## 2018-06-05 NOTE — CONSULTATION NOTE
Palliative Care Follow Up





- Referral


Referring Provider: Dr. Alas


Time of Visit: 0581-4718


Referral setting: Skilled Nursing Facility


Referral Reason: PSP/F/up on Hernia





- Information Sources


Records reviewed: RN notes reviewed, Previous records reviewed


History/Review of Systems obtained from: Patient, Family (spoke with sister 

Chidi), Caregiver (clnical staff/)


Exam limitations: Clinical condition (patient voice much softer; language more 

difficulty to understand)





- History of Present Illness Update


Brief HPI Update: 


This is a zen 73-year-old woman with progressive supranuclear palsy, she is 

currently living in the nursing home, this is related to increased care needs 

and support for her tube feedings.  She most recently was identified to have a 

"abdominal mass", she does have a known hernia, but there was some concern for 

tenderness and discomfort.  Though when followed up on patient was having no 

pain, nausea vomiting, or distress.  Her primary care provider did order her a 

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 

hernia, containing nonobstructed loops of bowel. On exam.  It is easily 

reducible, it is uncomfortable with deep palpation, patient does not have any 

pain at rest.  She does continue to have regular bowel movements, reports it 

rarely bothers her.  Her abdomen is soft, she is normal bowel tones,


Santhosh to be in a good mood today.  She continues to receive tube feedings with 

remaining weight neutral at around 148.





Patient does continue to have some slow decline, she does identify increased 

vision changes, some fatigue, she does like to be in her bed, this is where she 

is most comfortable and also feels most safe.  She is up in the wheelchair 

today though, she continues with development of contractures and spit 

particularly in her shoulders and elbows wrists.  She has had 3 recent falls, 

without any acute injury.  Patient does wonder what is going to happen next, 

but denies any distress, depression, or significant fears.  Her voice is 

getting quite soft, she is speaking just in few words not full sentences, most 

of her interaction is with yes nose and short answers.  She denies any increase 

choking of difficulty with secretions, and her lungs are clear, this will be 

her highest risk factor as far as future decline








Social History





- Living Situation


Living arrangement: Nursing home


Support System: 


Has paid shala Bruce several hours a week for personal care/socialization; sister 

visits regularly and oversees care; ex- Dexter visits weekly








Medications/Allergies





- Medications


Home Medications: 


 Ambulatory Orders











 Medication  Instructions  Recorded  Confirmed


 


Losartan [Cozaar] 12.5 mg PEG DAILY 09/24/13 04/23/18


 


Brimonidine 0.2% Ophth Drops 1 drops EACHEYE BID 03/05/17 04/23/18





[Alphagan P 0.2% Ophth Drops]   


 


Latanoprost 0.005% Ophth Drops 1 drops EACHEYE DAILY 03/05/17 04/23/18





[Xalatan Ophth Drops]   


 


Acetaminophen 500 mg PEG Q6HR PRN 07/19/17 04/23/18


 


Bisacodyl Supp [Dulcolax Supp] 10 mg [place] DAILY PRN 07/19/17 04/23/18


 


Escitalopram Oxalate 10 mg PEG DAILY 07/19/17 04/23/18


 


HYDROcod/ACETAM 5/325 [Norco 5/325] 2.5 mg PEG Q8HR PRN 07/19/17 04/23/18


 


Loperamide HCl [Loperamide] 10 ml PEG Q6HR PRN 07/19/17 04/23/18


 


Multivitamin W/Minerals [Theragran 10 ml PEG DAILY 07/19/17 04/23/18





M]   


 


Polyethylene Glycol 3350 [Miralax] 17 gm PEG DAILY 07/19/17 04/23/18


 


HYDROcod/ACETAM 5/325 [Norco 5/325] 2.5 mg PEG Q8HR PRN 09/13/17 04/23/18


 


Ondansetron [Zuplenz] 8 mg PEG Q8HR PRN 09/13/17 04/23/18


 


Senna [Senokot] 8.6 mg PEG .EVERY OTHER DAY 11/05/17 04/23/18


 


Omeprazole Magnesium [Prilosec] 20 mg PEG BID 12/14/17 04/23/18














- Allergies


Allergies/Adverse Reactions: 


 Allergies











Allergy/AdvReac Type Severity Reaction Status Date / Time


 


Iodinated Contrast- Oral and Allergy Severe Respiratory Verified 11/28/17 12:35





IV Dye     





[Iodinated Contrast Media -     





IV Dye]     


 


bee pollen [Bee Pollen] Allergy  Hives Verified 11/28/17 12:35


 


morphine AdvReac Intermediate Hallucinati Verified 11/28/17 12:35





   ons  














Review of Systems





- Constitutional


Constitutional: reports: Fatigue, Weight stable (remaining weight neutral 

around 148)





- Eyes


Eyes: reports: Vision loss (feels is worsening)





- Ears, Nose & Throat


Ears, Nose & Throat: reports: Hearing loss, Dry mouth, Other (difficult to do 

oral care with loss of ability to open mouth more than 1-2 cm)





- Cardiovascular


Cardiovascular: denies: Chest pain





- Respiratory


Respiratory: denies: Cough





- Gastrointestinal


Gastrointestinal: denies: Abdominal pain, Constipation, Reflux/heartburn





- Genitourinary


Genitourinary: reports: Incontinence





- Musculoskeletal


Musculoskeletal: reports: Stiffness, Limited range of motion, Muscle weakness, 

Joint pain (denies increase in pain; feels currrent medication working declines 

need for increase), Transfer issues (patient can be impulsive and stand 

independently; led to one of her falls)





- Integumentary


Integumentary: reports: Dryness





- Neurological


Neurological: reports: General weakness, Memory problems





- Psychiatric


Psychiatric: reports: Hallucinations.  denies: Depression





- Endocrine


Endocrine: reports: Diabetes type 2 (blood sugars in good range)





- Hematologic/Lymphatic


Hematologic/Lymphatic: reports: Recurrent infections (hx of UTI's non recently)





- All Other Systems


All Other Systems: reports: Reviewed and negative





Physical Exam





- Vital Signs


Temperature: 96.5 C


Pulse Rate: 63


Respiratory Rate: 18


O2 Saturation: 95 (ra @ rest)


Blood Pressure: 122/72





- Physical Exam


General Appearance: positive: No acute distress, Alert


Eyes Bilateral: positive: Conjunctivae nml, Other (needing to have me closer to 

talk/see face)


ENT: negative: Pharyngeal erythema


Neck: positive: Stiff neck (more acute angle of back head tilt)


Cardiovascular: positive: Regular rate & rhythm


Respiratory: positive: Breath sounds nml, Diminished in bases, Other (difficult 

for her to take deep breath; no cough during visit)


Abdomen: positive: Soft, Nml bowel sounds, Tenderness (with deep palpation over 

hernia area; easily reduced; soft bulge)


Skin: positive: Pallor


Extremities: positive: No pedal edema


Neurologic/Psychiatric: positive: Mood/affect nml, Disoriented to time, Weakness

, Slurred/abnml speech





Palliative Care





- POLST


Patient has POLST: Yes


POLST Status: DNR, Selective Treatment


Pain: Pain unchanged, Location (bilateral shoulders/some wrist)


Sleep: Sleeps well


Constipation: Yes, Opoid induced, Managed


Feelings of wellbeing/Perceived Quality of Life: Fair, Acceptable


Performance Status: 


Patient is dependent for all ADLs, does need assistance with toileting, she has 

a pivot transfer though this is becoming more difficult with her balance and 

eyesight.  She does participate in the activity group exercise when she is able 

to get up and ready.








- Palliative Care


Discussion: 


Discussion with patient is becoming more difficult as her communication is more 

compromised.  We did review the findings of her scan reassured hernia, we will 

watch it and do not expect any complications.  When asked what she worries 

about most, It is her ex- Dexter.  When followed up with sister Chidi, 

does report he is having some increased health problems.  We both suspect that 

communication is quite limited given his difficulty hearing.  Recently had a 

birthday, does perceive herself as worsening, denies she is fearful.  Patient 

does have a great babatunde, he does have some intermittent confusion and 

hallucinations so unable to discern her current understanding of the severity 

of her illness and what she anticipates.  She continues to find her quality of 

life acceptable at this point in time, we will continue to monitor for decline 

and revisit goals as indicated.








Results





- Lab Results


Lab and Imaging Results: 


Reviewed imaging; no current concern;








Impression and Recommendations





- Palliative Care


Impression: 


This is a zen 73-year-old woman with progressive super nuclear palsy, 

continues to have a very slow functional and cognitive decline.  She does 

present today with increased difficulty with communication, and more 

limitations in the context of her vision, but remains without acute signs or 

symptoms of depression or anxiety and satisfied with her current quality of 

life.  Palliative care to continue provide ongoing support to care team, until 

transition to hospice is appropriate





Recommendations/Counseling Done: 


1.  Spiglean Hernia. Of note in [employer] 20% of these hernias go on to become 

incarcerated.  Patient without any acute signs or symptoms, tenderness, or 

residual pain.  Given patient's frailty, and high risk surgical candidate, will 

continue to watch and monitor and patient not doing any activity of concern to 

worsen status or pressure. Reviewed findings with patient and sister.


2.PSP.  Patient continues to have very slow decline, both functional and 

cognitive.  She does remain dependent on tube feedings, weight neutral, no 

recent infections, skin breakdown, she has had a several falls over the last 

few weeks.  She certainly could have severe sequela given her fragile status 

from fall, the patient tends to be impulsive, plus is having increased balance 

and lower extremity weakness.


3.  Chronic bilateral shoulder pain.  Patient currently satisfied with regimen, 

declines any need for titration upward, she does have increased contractures 

noted.


4.  Advanced care planning.  Patient currently satisfied with support from 

palliative care team, continues to find quality of life, has a strong babatunde.  

SARAH ST in place.  Sister does recognize her fragility, will continue with 

palliative care every 6-8 weeks unless problems arise.





Time Spent: 


50 minutes with greater than 50% of this done in counseling coordination of 

care with clinical staff and follow-up with sister regarding anticipatory 

guidance

## 2018-06-11 ENCOUNTER — HOSPITAL ENCOUNTER (OUTPATIENT)
Dept: HOSPITAL 76 - PC | Age: 73
Discharge: HOME | End: 2018-06-11
Attending: NURSE PRACTITIONER
Payer: MEDICARE

## 2018-06-11 ENCOUNTER — HOSPITAL ENCOUNTER (OUTPATIENT)
Dept: HOSPITAL 76 - LAB.R | Age: 73
Discharge: HOME | End: 2018-06-11
Attending: SKILLED NURSING FACILITY
Payer: MEDICARE

## 2018-06-11 DIAGNOSIS — K21.9: ICD-10-CM

## 2018-06-11 DIAGNOSIS — Z79.899: Primary | ICD-10-CM

## 2018-06-11 DIAGNOSIS — R05: ICD-10-CM

## 2018-06-11 DIAGNOSIS — Z51.5: Primary | ICD-10-CM

## 2018-06-11 DIAGNOSIS — Z87.440: ICD-10-CM

## 2018-06-11 DIAGNOSIS — Z79.891: ICD-10-CM

## 2018-06-11 DIAGNOSIS — K43.9: ICD-10-CM

## 2018-06-11 DIAGNOSIS — R32: ICD-10-CM

## 2018-06-11 DIAGNOSIS — E11.9: ICD-10-CM

## 2018-06-11 DIAGNOSIS — Z79.2: ICD-10-CM

## 2018-06-11 DIAGNOSIS — Z66: ICD-10-CM

## 2018-06-11 DIAGNOSIS — G23.1: ICD-10-CM

## 2018-06-11 LAB
ANION GAP SERPL CALCULATED.4IONS-SCNC: 7 MMOL/L (ref 6–13)
BASOPHILS NFR BLD AUTO: 0 10^3/UL (ref 0–0.1)
BASOPHILS NFR BLD AUTO: 0.6 %
BUN SERPL-MCNC: 22 MG/DL (ref 6–20)
CALCIUM UR-MCNC: 9.2 MG/DL (ref 8.5–10.3)
CHLORIDE SERPL-SCNC: 102 MMOL/L (ref 101–111)
CO2 SERPL-SCNC: 28 MMOL/L (ref 21–32)
CREAT SERPLBLD-SCNC: 0.7 MG/DL (ref 0.4–1)
EOSINOPHIL # BLD AUTO: 0.2 10^3/UL (ref 0–0.7)
EOSINOPHIL NFR BLD AUTO: 4.8 %
ERYTHROCYTE [DISTWIDTH] IN BLOOD BY AUTOMATED COUNT: 12.6 % (ref 12–15)
GFRSERPLBLD MDRD-ARVRAT: 82 ML/MIN/{1.73_M2} (ref 89–?)
GLUCOSE SERPL-MCNC: 99 MG/DL (ref 70–100)
HGB UR QL STRIP: 13.7 G/DL (ref 12–16)
LYMPHOCYTES # SPEC AUTO: 0.9 10^3/UL (ref 1.5–3.5)
LYMPHOCYTES NFR BLD AUTO: 23.2 %
MCH RBC QN AUTO: 31 PG (ref 27–31)
MCHC RBC AUTO-ENTMCNC: 34 G/DL (ref 32–36)
MCV RBC AUTO: 91.3 FL (ref 81–99)
MONOCYTES # BLD AUTO: 0.6 10^3/UL (ref 0–1)
MONOCYTES NFR BLD AUTO: 14.2 %
NEUTROPHILS # BLD AUTO: 2.3 10^3/UL (ref 1.5–6.6)
NEUTROPHILS # SNV AUTO: 4 X10^3/UL (ref 4.8–10.8)
NEUTROPHILS NFR BLD AUTO: 57.2 %
PDW BLD AUTO: 7.8 FL (ref 7.9–10.8)
PLATELET # BLD: 228 10^3/UL (ref 130–450)
RBC MAR: 4.41 10^6/UL (ref 4.2–5.4)
SODIUM SERPLBLD-SCNC: 137 MMOL/L (ref 135–145)

## 2018-06-11 PROCEDURE — 85025 COMPLETE CBC W/AUTO DIFF WBC: CPT

## 2018-06-11 PROCEDURE — 99310 SBSQ NF CARE HIGH MDM 45: CPT

## 2018-06-11 PROCEDURE — 80048 BASIC METABOLIC PNL TOTAL CA: CPT

## 2018-06-11 NOTE — CONSULTATION NOTE
Palliative Care Follow Up





- Referral


Referring Provider: Dr. Cherrie Fair


Time of Visit: 115-1200


Referral setting: Skilled Nursing Facility


Referral Reason: Cough/Wheezing





- Information Sources


Records reviewed: Previous records reviewed


History/Review of Systems obtained from: Patient, Caregiver (clinical staff)


Exam limitations: Clinical condition (patient with increased difficulty with 

communication; poor STM recall)





- History of Present Illness Update


Brief HPI Update: 


This is a zen 73-year-old woman with progressive supranuclear palsy, she is 

currently living in a nursing home, this is related needed to her increased 

care needs and support for her tube feedings.  I was asked to see her as she 

has developed a cough and wheezing in the last 24-48 hrs.  They have used some 

Tussin DM with some decrease in her distress, unfortunately with patient's 

disease process she is unable to produce an effective cough, and the process is 

more or expelling of air to clear her airways. She does complain of cough when 

asked, denies shortness of breath or respiratory distress, but is finding the 

cough uncomfortable.  She does have expiratory wheezing on the left.  And she 

is diminished in the bases.  She is afebrile at this point in time, nor 

tachycardic, But given her high risk of pneumonia it is of concern.  Staff are 

unable to identify any specific episode of choking, The patient has tube feeding

, patient still needs to manage her oral secretions.  In consult with her 

primary care provider, Dr. Alas, will go ahead and draw labs, get chest x-ray

, and start her on antibiotics.








Social History





- Living Situation


Living arrangement: Nursing home





Medications/Allergies





- Medications


Home Medications: 


 Ambulatory Orders











 Medication  Instructions  Recorded  Confirmed


 


Losartan [Cozaar] 12.5 mg PEG DAILY 09/24/13 06/11/18


 


Brimonidine 0.2% Ophth Drops 1 drops EACHEYE BID 03/05/17 06/11/18





[Alphagan P 0.2% Ophth Drops]   


 


Latanoprost 0.005% Ophth Drops 1 drops EACHEYE DAILY 03/05/17 06/11/18





[Xalatan Ophth Drops]   


 


Acetaminophen 500 mg PEG Q6HR PRN 07/19/17 06/11/18


 


Bisacodyl Supp [Dulcolax Supp] 10 mg [place] DAILY PRN 07/19/17 06/11/18


 


Escitalopram Oxalate 10 mg PEG DAILY 07/19/17 06/11/18


 


HYDROcod/ACETAM 5/325 [Norco 5/325] 2.5 mg PEG Q8HR PRN 07/19/17 06/11/18


 


Loperamide HCl [Loperamide] 10 ml PEG Q6HR PRN 07/19/17 06/11/18


 


Multivitamin W/Minerals [Theragran 10 ml PEG DAILY 07/19/17 06/11/18





M]   


 


Polyethylene Glycol 3350 [Miralax] 17 gm PEG DAILY 07/19/17 06/11/18


 


HYDROcod/ACETAM 5/325 [Norco 5/325] 2.5 mg PEG Q8HR PRN 09/13/17 06/11/18


 


Ondansetron [Zuplenz] 8 mg PEG Q8HR PRN 09/13/17 06/11/18


 


Senna [Senokot] 8.6 mg PEG .EVERY OTHER DAY 11/05/17 06/11/18


 


Omeprazole Magnesium [Prilosec] 20 mg PEG BID 12/14/17 06/11/18


 


Amox/Clav 875/125 [Augmentin 1 tab PEG BID MDD for 7 days 06/11/18 06/11/18





875/125]   


 


Guaifenesin/Dextromethorphan 5 ml PEG TID MDD 7 days 06/11/18 06/11/18





[Tussin Dm Cough Syrup]   


 


Saccharomyces Boulardii [Florastor] 250 mg PO BID MDD for 14 days 06/11/18 06/11 /18














- Allergies


Allergies/Adverse Reactions: 


 Allergies











Allergy/AdvReac Type Severity Reaction Status Date / Time


 


Iodinated Contrast- Oral and Allergy Severe Respiratory Verified 11/28/17 12:35





IV Dye     





[Iodinated Contrast Media -     





IV Dye]     


 


bee pollen [Bee Pollen] Allergy  Hives Verified 11/28/17 12:35


 


morphine AdvReac Intermediate Hallucinati Verified 11/28/17 12:35





   ons  














Review of Systems





- Constitutional


Constitutional: reports: Weight stable





- Eyes


Eyes: reports: Vision loss





- Respiratory


Respiratory: reports: Cough (noted by staff for about 24 hours; sister noted 

some cough Friday night when seen), Wheezing





- Gastrointestinal


Gastrointestinal: reports: Other (TF).  denies: Abdominal pain





- Genitourinary


Genitourinary: reports: Incontinence.  denies: Dysuria





- Musculoskeletal


Musculoskeletal: reports: Stiffness, Muscle weakness, Transfer issues (max 

assist transfers to wheelchair; up a couple of times a day; prefers laying in 

bed more comfortable)





- Integumentary


Integumentary: reports: Dryness





- Neurological


Neurological: reports: Slurred speech (increasing difficulty with communication)





- Endocrine


Endocrine: reports: Diabetes type 2 (blood sugars fine)





- Hematologic/Lymphatic


Hematologic/Lymphatic: reports: Recurrent infections (one hx tx in last few 

months aspiration; historically UTIs has been a while)





- All Other Systems


All Other Systems: reports: Other (limited ROS related to patient's ability to 

participate)





Physical Exam





- Vital Signs


Temperature: 97.2 C


Pulse Rate: 67


Respiratory Rate: 18


O2 Saturation: 95 (ra @ rest)


Blood Pressure: 92/62





- Physical Exam


General Appearance: positive: Mild distress, Anxious


Eyes Bilateral: positive: Other (tipped back to view me; more trouble seeing/

identifying people)


ENT: positive: Other (limited ability to open mouth; contracture)


Neck: positive: Trachea midline, Stiff neck (head/neck tipped back; unable to 

soften or tilt forward)


Cardiovascular: positive: Regular rate & rhythm


Respiratory: positive: Diminished in bases, Wheezes (expiratory wheezing on 

left side), Other (patient can't "cough" air explulsion with attempt to cough 

high pitched; denies distress but is bothersome;)


Abdomen: positive: Non-tender, Soft, Other (no bulge in laying down; hernia 

reduced at time of visit)


Skin: positive: Pallor, Dryness


Extremities: positive: No pedal edema


Neurologic/Psychiatric: positive: Weakness, Slurred/abnml speech, Other (unable 

to discern orientation; recognize myself; appears aware in [facility] but not 

clear if knows facility)





Palliative Care





- POLST


Patient has POLST: Yes


POLST Status: DNR, Selective Treatment


Pain: Pain unchanged


Tiredness/Fatigue: Moderate (4-6)


Anxiety: Moderate (4-6) (cough somewhat distressing; does cause worry)


Dyspnea: Mild (1-3) (denies respiratory distress; cough hard to manage)


Constipation: Yes, Opoid induced, Managed





- Palliative Care


Discussion: 


Telephone call to Chidi, patient's sister.  Discussed current findings and 

Decision to treat in the context of comfort measure.  Sister is aware of patient

's fragile status, she has had continued slow functional and cognitive decline.

  Very cognizant of patient's most likely end-of-life event will be infection.  

They do have family coming in a couple weekends, was hoping to be able to bring 

her home for family event, discussed will continue to weigh benefits and 

burdens closer to the time given that this is something that Dara usually 

enjoys.








Impression and Recommendations





- Palliative Care


Impression: 


This is a 73-year-old woman with progressive supranuclear palsy who remains 

quite frail, and high risk for the sequela of events related to aspiration.  

She does present with cough and wheezing today, will go ahead and treat as well 

as obtained appropriate labs and chest x-ray.  Palliative care to continue 

provide ongoing support to care team.





Recommendations/Counseling Done: 


1.  Cough.  Patient is started on Augmentin 875 mg/125 mg 1 twice daily 7 days

, labs ordered CMP and BMP, as well as chest x-ray.  Consult done with primary 

care provider regarding plan of care, she will follow-up with visit on 

Wednesday.  Called his sister regarding current findings and treatment plan, 

she is in agreement. Probiotics ordered Florastor 250 mg twice daily 14 days





2.  Spigelian hernia.  Patient without any tenderness, swelling, or distressed 

noted today.  Will continue to monitor.


3.  PSP.  Patient continues to have very slow decline, both functional and 

cognitive.  Concern remains with disease progression management of her oral 

secretions and aspiration risk, as noted today.


4.  Advanced care planning.  Patient does have a SARAH ST in place, continues to 

remain quite fragile, decisions are made in the context of benefits and burdens 

with running her sister that primary decision maker





Time Spent: 


Time spent 45 minutes with greater than 50% of this done and coordination of 

care with provider, clinical staff, sister, and anticipatory guidance

## 2018-06-20 ENCOUNTER — HOSPITAL ENCOUNTER (OUTPATIENT)
Dept: HOSPITAL 76 - PC | Age: 73
Discharge: HOME | End: 2018-06-20
Attending: NURSE PRACTITIONER
Payer: MEDICARE

## 2018-06-20 DIAGNOSIS — Z51.5: Primary | ICD-10-CM

## 2018-06-20 DIAGNOSIS — E11.9: ICD-10-CM

## 2018-06-20 DIAGNOSIS — Z66: ICD-10-CM

## 2018-06-20 DIAGNOSIS — R05: ICD-10-CM

## 2018-06-20 DIAGNOSIS — G23.1: ICD-10-CM

## 2018-06-20 DIAGNOSIS — Z93.1: ICD-10-CM

## 2018-06-20 PROCEDURE — 99310 SBSQ NF CARE HIGH MDM 45: CPT

## 2018-06-20 NOTE — CONSULTATION NOTE
Palliative Care Follow Up





- Referral


Referring Provider: Dr. Cherrie Alas


Time of Visit: 1030-11:15


Referral setting: Skilled Nursing Facility


Referral Reason: PSP/Bronchitis/Cough





- Information Sources


Records reviewed: RN notes reviewed, Previous records reviewed


History/Review of Systems obtained from: Patient, Family (spoke with Chidi her 

sister), Caregiver (Clinical staff)


Exam limitations: Clinical condition (patient increasing difficulty with 

communication; voice weak and soft)





- History of Present Illness Update


Brief HPI Update: 


This is a zen 73-year-old woman with progressive supranuclear palsy, she is 

currently living in a nursing home.  She had developed a cough and a wheeze, 

she did have her chest x-ray, that showed chaya-hilar bronchitis.  She had been 

started on Augmentin 875 mg/125 mg 1 tab twice a day 7 days and has just 

finished this.  Did not make a significant difference in her cough but breath 

sounds improved, she has not progressed with fever, and her vital signs have 

remained stable..  Her breath sounds are clear in bases but does have upper 

airway rhonchi that appear more to do with unable to manage her oral secretions/

saliva.  Staff reported continued cough, her cough is quite distressing in that 

she uses her vocal cords and tries to expel the air, at this point in time it 

is not efficient.  She denies any distress, is just frequent and she is getting 

some upper chest discomfort with this.  Her O2 sats are fine, but most likely 

is continuing to silently aspirate.  Patient does do better in positioning with 

her head of bed up, unfortunately found her slithered down again, requested she 

be put up in the wheelchair to assist with her secretions.








Social History





- Living Situation


Living arrangement: Nursing home


Support System: 


Her sister Chidi is her advocate, she does visit Quite often.  She does have a 

paid caregiver Teo who spends time with her as far as personal care and 

companionship as well.  Chidi is hoping to be able to take her out this 

weekend with her granddaughters, it is more difficult for patient to be 

transported out of facility.








Medications/Allergies





- Medications


Home Medications: 


 Ambulatory Orders











 Medication  Instructions  Recorded  Confirmed


 


Losartan [Cozaar] 12.5 mg PEG DAILY 09/24/13 06/20/18


 


Brimonidine 0.2% Ophth Drops 1 drops EACHEYE BID 03/05/17 06/20/18





[Alphagan P 0.2% Ophth Drops]   


 


Latanoprost 0.005% Ophth Drops 1 drops EACHEYE DAILY 03/05/17 06/20/18





[Xalatan Ophth Drops]   


 


Acetaminophen 500 mg PEG Q6HR PRN 07/19/17 06/20/18


 


Bisacodyl Supp [Dulcolax Supp] 10 mg [place] DAILY PRN 07/19/17 06/20/18


 


Escitalopram Oxalate 10 mg PEG DAILY 07/19/17 06/20/18


 


HYDROcod/ACETAM 5/325 [Norco 5/325] 2.5 mg PEG Q8HR PRN 07/19/17 06/20/18


 


Loperamide HCl [Loperamide] 10 ml PEG Q6HR PRN 07/19/17 06/20/18


 


Multivitamin W/Minerals [Theragran 10 ml PEG DAILY 07/19/17 06/20/18





M]   


 


Polyethylene Glycol 3350 [Miralax] 17 gm PEG DAILY 07/19/17 06/20/18


 


HYDROcod/ACETAM 5/325 [Norco 5/325] 2.5 mg PEG Q8HR PRN 09/13/17 06/20/18


 


Ondansetron [Zuplenz] 8 mg PEG Q8HR PRN 09/13/17 06/20/18


 


Senna [Senokot] 8.6 mg PEG .EVERY OTHER DAY 11/05/17 06/20/18


 


Omeprazole Magnesium [Prilosec] 20 mg PEG BID 12/14/17 06/20/18


 


Guaifenesin/Dextromethorphan 5 ml PEG Q6HR PRN 06/11/18 06/20/18





[Tussin Dm Cough Syrup]   


 


Saccharomyces Boulardii [Florastor] 250 mg PO BID MDD for 14 days 06/11/18 06/20 /18


 


Hyoscyamine [Levsin] 0.125 mg SL Q4HR PRN MDD secretions 06/20/18 06/20/18














- Allergies


Allergies/Adverse Reactions: 


 Allergies











Allergy/AdvReac Type Severity Reaction Status Date / Time


 


Iodinated Contrast- Oral and Allergy Severe Respiratory Verified 11/28/17 12:35





IV Dye     





[Iodinated Contrast Media -     





IV Dye]     


 


bee pollen [Bee Pollen] Allergy  Hives Verified 11/28/17 12:35


 


morphine AdvReac Intermediate Hallucinati Verified 11/28/17 12:35





   ons  














Review of Systems





- Constitutional


Constitutional: reports: Fatigue.  denies: Fever, Chills





- Eyes


Eyes: reports: Vision loss (continue to worsen)





- Ears, Nose & Throat


Ears, Nose & Throat: reports: Dry mouth





- Cardiovascular


Cardiovascular: reports: Chest pain (related to coughing per patient; worsens 

with cough effort)





- Respiratory


Respiratory: reports: Cough (continued  frequent coughing; patient denies 

distress but more annoyance)





- Gastrointestinal


Gastrointestinal: reports: Other (on tube feedings).  denies: Constipation, 

Bloody stools, Reflux/heartburn





- Genitourinary


Genitourinary: reports: Incontinence.  denies: Dysuria





- Musculoskeletal


Musculoskeletal: reports: Transfer issues (max transfer assist; balance issues 

and anxiety; difficult following cuing; can shuffle few steps and bear weight)





- Integumentary


Integumentary: reports: Dryness, Other (PEG site)





- Neurological


Neurological: reports: General weakness, Memory problems, Slurred speech





- Psychiatric


Psychiatric: reports: Hallucinations (unclear if hallucinations or difficulty 

with interpreting environment).  denies: Depression





- Endocrine


Endocrine: reports: Diabetes type 2 (bs wnl)





- Hematologic/Lymphatic


Hematologic/Lymphatic: reports: Recurrent infections (second respiriatory 

infection in several months)





- All Other Systems


All Other Systems: reports: Reviewed and negative





Physical Exam





- Vital Signs


Temperature: 97.0 C


Pulse Rate: 65


Respiratory Rate: 18


O2 Saturation: 95 (ra@ rest)


Blood Pressure: 122/72





- Physical Exam


General Appearance: positive: Mild distress (with cough).  negative: Anxious, 

Lethargic


Eyes Bilateral: positive: Normal inspection


ENT: positive: Dry mucous membranes


Neck: positive: Stiff neck (contracted extended;)


Cardiovascular: positive: Regular rate & rhythm


Respiratory: positive: Rhonchi (anteriorly upper airway;), Other (decreased 

throughout but clear lower lobes).  negative: Wheezes


Abdomen: positive: Soft, Nml bowel sounds, Other (soft right abd hernia-

nontender)


Skin: positive: Pallor


Extremities: positive: No pedal edema, Other (right arm with severe contracture

; left mod)


Neurologic/Psychiatric: positive: Slurred/abnml speech (voice weak more 

difficult to understand), Other (unable to confirm orientation; but recognized 

NP and answered short questions regarding in the moment symptoms appropriately, 

no long term recall noted)





Palliative Care





- POLST


Patient has POLST: Yes


POLST Status: DNR, Selective Treatment


Pain: Pain unchanged, Location (shoulders/arms)


Constipation: Yes, Opoid induced, Managed


Feelings of wellbeing/Perceived Quality of Life: Comment





- Palliative Care


Discussion: 


Patient denies being fearful regarding her coughing and choking, despite the 

quite annoying.  She also complains of some discomfort with it.  We discussed 

in the contacts as far as sharing information, most likely still has more to do 

with her disease, but continue to focus on trying to keep her comfortable.  She 

continues to be quite zen in her responses, makes good eye contact, and does 

not seem in any acute distress or depressed.  I did call her sister Chidi, she 

confirms the same, that she still seems to enjoy despite her limited quality of 

life interacting with family, caregivers, and her environment.  Chidi is 

concerned though as far as patient taking another decline, she is acutely aware 

the seriousness of her illness, and that often respiratory issues are the cause 

for end-of-life. He patient does have her granddaughter is coming this weekend, 

we discussed at length patient does cough at times, but does appear to recover, 

would not make any difference if she were at home her home setting are here at 

the facility, it just takes time for her to clear her secretions and settle 

down.  We did discuss will be trying some medication to see if can improve this

, but does have side effects and concern will monitor best not to add to her 

confusion or her discomfort








Impression and Recommendations





- Palliative Care


Impression: 


This is a 73-year-old woman with progressive supranuclear palsy remains quite 

fragile, and high risk for the sequela of events related to aspiration 

secondary to disease.  She has been treated for acute bronchitis, but still has 

residual cough and choking.  Unclear if patient is going to continue to improve

, or if this is part of her decline.  Focus continues to be on maximizing 

quality of life.  Palliative care to provide support regarding symptom 

management and anticipatory guidance until transition to hospice





Recommendations/Counseling Done: 


Cough.  Patient has completed Augmentin, with little improvement.  But no 

worsening of her condition.  Her vital signs are stable she is afebrile.  Her 

wheezing has resolved, but continues to have upper anterior rhonchi secondary 

to difficulty clearing secretions.  Consult with pharmacist regarding 

anticholinergic with the side effects, settled on hyoscyamine 0.125 mg odt 

eveery 4 hours. Instruction given to staff to monitor for signs or symptoms of 

confusion her urinary retention.  Goal is to decrease oral secretions to manage 

her cough more effectively.  Cough syrup at this point in time most likely to 

not be of benefit.Did request patient does have oral suction machine that it be 

set up in her room in case of acute distress.


2.  PSP.  Patient continues to have slow decline, both functional and 

cognitive.  Concern relating difficulty managing oral secretions and aspiration 

risk are related to disease progression and not acute illness, will monitor and 

evaluate response.


3.  Advanced care planning.  Patient does have SARAH ST in place, Sister aware of 

the fragility of situation.  She perceives her sister still enjoying her 

current quality of life, though it is certainly limited and there is concern 

about her pending decline.  Did follow-up with Dr. Cherrie Alas her PCP given 

current situation and agreement with current care plan.





Time Spent: 


45 minutes with good than 50% of this done in Counseling and coordination of 

care with clinical staff PCP sister and anticipatory guidance provided

## 2018-07-02 ENCOUNTER — HOSPITAL ENCOUNTER (OUTPATIENT)
Dept: HOSPITAL 76 - PC | Age: 73
Discharge: HOME | End: 2018-07-02
Attending: NURSE PRACTITIONER
Payer: MEDICARE

## 2018-07-02 DIAGNOSIS — R05: ICD-10-CM

## 2018-07-02 DIAGNOSIS — Z66: ICD-10-CM

## 2018-07-02 DIAGNOSIS — Z93.1: ICD-10-CM

## 2018-07-02 DIAGNOSIS — G23.1: ICD-10-CM

## 2018-07-02 DIAGNOSIS — Z99.3: ICD-10-CM

## 2018-07-02 DIAGNOSIS — E11.9: ICD-10-CM

## 2018-07-02 DIAGNOSIS — Z51.5: Primary | ICD-10-CM

## 2018-07-02 PROCEDURE — 99310 SBSQ NF CARE HIGH MDM 45: CPT

## 2018-07-02 NOTE — CONSULTATION NOTE
Palliative Care Follow Up





- Referral


Referring Provider: Dr. Kaykay Alas/transitioning to Dr. Luis A Argueta


Time of Visit: [id number]


Referral setting: Skilled Nursing Facility


Referral Reason: PSP/Goals of Care





- Information Sources


Records reviewed: RN notes reviewed, Previous records reviewed


History/Review of Systems obtained from: Patient, Family (spoke with sister 

Chidi Euceda before and during visit)


Exam limitations: Clinical condition (Patient with increasing difficulty 

communicating, difficult to understand if able to understand all of 

conversation.  Patient does make eye contact and engages with NP)





- History of Present Illness Update


Brief HPI Update: 


This is a zen 73-year-old woman with progressive supranuclear palsy, whom I 

have been seen since January 2017.  She has had progressive decline, including 

increased difficulty with dysphagia, necessitating a placement of a feeding 

tube when swallowing was too difficult and resulted in choking on a regular 

basis.  This occurred on 8/22/2017, she had previously been at [facility], but 

needed to be in a higher level of care, that she has been a resident at the 

nursing home since then. Over the last 18 months she has become progressively 

more wheelchair bound, and over the last few weeks has been spending more time 

in bed.  She is a pivot transfer into her tilt in space wheelchair, she does 

have significant difficulty with her vision, so her neck is permanently tilted 

back.  She does have increased upper extremity contractures particularly on the 

right, limited range of motion in her shoulders.  She is able to move her legs 

spontaneously, and follow commands. She is incontinent of urine, occasionally 

incontinent of stool.  This is more to do with her ability to communicate with 

staff.





The greatest concern at this point in time, is her inability to manage her oral 

secretions.  She did present with most likely aspiration, her chest x-ray 

showed perihilar bronchitis.  She did get an course of Augmentin, with some 

improvement.  Unfortunately when she coughs, she is not using a cough effort 

but asked avulsion of air through her vocal cords, this can be be quite 

distressing to staff and surrounding residents. She has had very little 

response to cough medicine, I did start her on Hyoscyamine 0.125 mg ODT every 4 

hours, they have used it with some success, though it is quite drying. The 

medication administration sheet shows only once a day the last few days. She 

does report she feels "lousy", she denies being distressed by the coughing, 

though appears quite fatigued.  She denies any increase in pain or chest 

discomfort, she denies shortness of breath.








Social History





- Living Situation


Living arrangement: Nursing home


Support System: 


Her sister Chidi Virgen is her DPOAE, she does come visit several times a week, 

she does have a paid caregiver Teo who also provide socialization and some 

personal care.  She has been at the [facility] since August 2017, her increasing 

difficulty with communication makes it more difficult for staff to care for her.








Medications/Allergies





- Medications


Home Medications: 


 Ambulatory Orders











 Medication  Instructions  Recorded  Confirmed


 


Losartan [Cozaar] 12.5 mg PEG DAILY 09/24/13 07/02/18


 


Brimonidine 0.2% Ophth Drops 1 drops EACHEYE BID 03/05/17 07/02/18





[Alphagan P 0.2% Ophth Drops]   


 


Latanoprost 0.005% Ophth Drops 1 drops EACHEYE DAILY 03/05/17 07/02/18





[Xalatan Ophth Drops]   


 


Acetaminophen 500 mg PEG Q6HR PRN 07/19/17 07/02/18


 


Bisacodyl Supp [Dulcolax Supp] 10 mg [place] DAILY PRN 07/19/17 07/02/18


 


Escitalopram Oxalate 10 mg PEG DAILY 07/19/17 07/02/18


 


HYDROcod/ACETAM 5/325 [Norco 5/325] 2.5 mg PEG Q8HR PRN 07/19/17 07/02/18


 


Loperamide HCl [Loperamide] 10 ml PEG Q6HR PRN 07/19/17 07/02/18


 


Multivitamin W/Minerals [Theragran 10 ml PEG DAILY 07/19/17 07/02/18





M]   


 


Polyethylene Glycol 3350 [Miralax] 17 gm PEG DAILY 07/19/17 07/02/18


 


HYDROcod/ACETAM 5/325 [Norco 5/325] 2.5 mg PEG Q8HR PRN 09/13/17 07/02/18


 


Ondansetron [Zuplenz] 8 mg PEG Q8HR PRN 09/13/17 07/02/18


 


Senna [Senokot] 8.6 mg PEG .EVERY OTHER DAY 11/05/17 07/02/18


 


Omeprazole Magnesium [Prilosec] 20 mg PEG BID 12/14/17 07/02/18


 


Guaifenesin/Dextromethorphan 5 ml PEG Q6HR PRN 06/11/18 07/02/18





[Tussin Dm Cough Syrup]   


 


Hyoscyamine [Levsin] 0.125 - 0.25 mg SL Q4HR PRN MDD 06/20/18 07/02/18





 secretions  














- Allergies


Allergies/Adverse Reactions: 


 Allergies











Allergy/AdvReac Type Severity Reaction Status Date / Time


 


Iodinated Contrast- Oral and Allergy Severe Respiratory Verified 11/28/17 12:35





IV Dye     





[Iodinated Contrast Media -     





IV Dye]     


 


bee pollen [Bee Pollen] Allergy  Hives Verified 11/28/17 12:35


 


morphine AdvReac Intermediate Hallucinati Verified 11/28/17 12:35





   ons  














Review of Systems





- Constitutional


Constitutional: reports: Fatigue, Weight stable (148.5).  denies: Fever, Chills





- Eyes


Eyes: reports: Vision loss (worsening;)





- Ears, Nose & Throat


Ears, Nose & Throat: reports: Dry mouth, Other (difficulty opening mouth; jaw 

tight).  denies: Ear pain





- Cardiovascular


Cardiovascular: denies: Chest pain, Edema





- Respiratory


Respiratory: reports: Cough.  denies: Sputum production, SOB at rest





- Gastrointestinal


Gastrointestinal: reports: Other (on Jevity 1.2 ml/hr).  denies: Constipation, 

Diarrhea, Nausea, Reflux/heartburn





- Genitourinary


Genitourinary: reports: Incontinence.  denies: Dysuria





- Musculoskeletal


Musculoskeletal: reports: Stiffness, Limited range of motion, Muscle weakness, 

Transfer issues (pivot transfers into tilt n space wheelchair)





- Integumentary


Integumentary: reports: Dryness





- Neurological


Neurological: reports: General weakness, Memory problems (fluctuating cognitive 

status; diff. to evaluate with worsening communication)





- Psychiatric


Psychiatric: reports: Hallucinations.  denies: Depression, Anxiety





- Endocrine


Endocrine: reports: Diabetes type 2 (BS controlled)





- Hematologic/Lymphatic


Hematologic/Lymphatic: reports: Recurrent infections (last UTI Jan/recent tx 

for bronchitis with Augmentin)





- All Other Systems


All Other Systems: reports: Reviewed and negative





Physical Exam





- Vital Signs


Temperature: 97.2 C


Pulse Rate: 66


Respiratory Rate: 18


O2 Saturation: 95 (ra @ rest)


Blood Pressure: 122/72





- Physical Exam


General Appearance: positive: No acute distress, Other (appears fatigue; less 

"luster" and brightness from baseline)


Eyes Bilateral: positive: Other (gaze downard)


ENT: positive: Dry mucous membranes


Neck: positive: Stiff neck (head contracted back and shoulders pulled up)


Cardiovascular: positive: Regular rate & rhythm


Respiratory: positive: Diminished in bases, Other (unable to take deep breaths 

in).  negative: Wheezes, Rales, Rhonchi


Abdomen: positive: Non-tender, Soft, Nml bowel sounds, Other (soft hernia 

without tenderness)


Skin: positive: Pallor.  negative: Pressure wound


Extremities: positive: No pedal edema


Neurologic/Psychiatric: positive: Disoriented to place, Disoriented to time, 

Weakness





Palliative Care





- POLST


Patient has POLST: Yes


POLST Status: DNR, Selective Treatment


Pain: Pain unchanged, Location (bilateral shoulder pain/stiffness reports 

controlled on current regimen)


Tiredness/Fatigue: Moderate (4-6)


Drowsiness/Sedation: Mild (1-3)


Nausea: None


Depression: Mild (1-3)


Anxiety: Moderate (4-6)


Dyspnea: None


Constipation: Yes, Opoid induced, Managed


Feelings of wellbeing/Perceived Quality of Life: Worsening


Performance Status: 


Patient spending more time in bed, this is where she is most comfortable.  

Staff do get her up several times a day in the chair, this does manage her 

secretions better.  She does like to sit down the hallway.  His total 

assistance with incontinence and toileting, bathing, and assistance with 

dressing and oral care.








- Palliative Care


Discussion: 


Patient with increased difficulty with communication, when asked how she felt 

she reported "lousy".  We did discuss that her disease is getting worse, in 

somewhat of back and forth manner discussed that we are at a place of heart 

decisions, that things will continue to worsen, and concerned about her 

secretions.  Introduced if patient were to need further intervention, most 

likely would need hospitalization and to be put on a machine for breathing, she 

would not be unable to communicate, and unlikely to return back to her nursing 

home.  This would mean she would not be able to enjoy friends and family, and 

her current quality of life.  We did discuss this in a variety of different ways

, and we do have the ability to focus on comfort only, supporting her in her 

current environment, and when her time came allowing natural death.  As best I 

could tell in our conversation, this is what she was most interested in, she 

does have "great babatunde", she found this quite comforting and reports she is not 

afraid.





In follow-up with her sister, she does feel like enough of her is in there, and 

she can participate in the conversation.  She has been very much participatory 

and choosing further interventions up to this point, but I shared she seemed 

more reticent as far as doing anything to change her current situation, 

including going to the hospital for further aggressive interventions that would 

not really results with improvement of her quality of life and take her away 

from her family. Did spend some time supporting Chidi, she reports "I can see 

that and", up to this point in time she was quite aware of the seriousness of 

her illness, but is feeling much more vulnerable as Dara is declining.  She 

reports she is quite self aware, I did offer the support of the  

or , she did decline at this point in time.  I did share with her I 

felt that she would be better served by transitioning to hospice, if we can all 

be in agreement for her goals, she is open to this and willing for me to follow-

up with the medical hospice director.








Impression and Recommendations





- Palliative Care


Impression: 


This is a zen 73-year-old woman with progressive super nuclear palsy, who 

continues to demonstrate decline.  She is continuing to have difficulty 

managing her oral secretions, continues with fluctuating cognition, and has had 

functional decline as well.  Palliative care is been providing support for pain 

and symptom management, currently actively looking at transitioning to hospice.





Recommendations/Counseling Done: 


1. Cough. Patient with weak cough effort, has had some mild response to the 

hyoscyamine, will increase to 1-2 tabs every four hours. Encouraged to have 

patient up in wheelchair if having trouble managing secretions. Discussed both 

with patient and sister most likely not to improve much with our limited 

interventions, and  most likely to worsen. Yakur suction at bedside as 

requested. Patient currently has had limited coughing spells though are 

fatiguing are not "scary" for patient.


2. Advanced Care planning. Lengthy attempt to have conversation, trying to 

weigh decision making capacity, appears to understand some of the conversation 

though often echos back phrases. Counseling regarding observed deterioration, 

concerns about managing secretions/cough/choking, and goals to focus on comfort 

vs aggresive interventions / hospitalization. Given the consequences if chose 

aggressive interventions and patient wanting to be here with family, agreed 

trusted decisions for focusing on comfort. This was communicated with her sister

, given permission to pursue hospice referral if accepted by medical director.





ADDENDUM: Case reviewed with Dr. Solomon Barragan, is willing to accept on 

hospice as long as goals are defined. This was communicated to Chidi, agreed I 

would follow up with Dara at our visit next Monday specifically for hospice 

support.





Time Spent: 


60 minutes with greater than 50% of this done in counseling with patient/follow 

up with ALFIE/Chidi, coordination of care with clinical staff/hospice.

## 2018-07-09 ENCOUNTER — HOSPITAL ENCOUNTER (OUTPATIENT)
Dept: HOSPITAL 76 - PC | Age: 73
Discharge: HOME | End: 2018-07-09
Attending: NURSE PRACTITIONER
Payer: MEDICARE

## 2018-07-09 DIAGNOSIS — H54.7: ICD-10-CM

## 2018-07-09 DIAGNOSIS — Z99.3: ICD-10-CM

## 2018-07-09 DIAGNOSIS — Z66: ICD-10-CM

## 2018-07-09 DIAGNOSIS — Z79.891: ICD-10-CM

## 2018-07-09 DIAGNOSIS — Z86.19: ICD-10-CM

## 2018-07-09 DIAGNOSIS — Z87.440: ICD-10-CM

## 2018-07-09 DIAGNOSIS — G23.1: ICD-10-CM

## 2018-07-09 DIAGNOSIS — R13.10: ICD-10-CM

## 2018-07-09 DIAGNOSIS — Z51.5: Primary | ICD-10-CM

## 2018-07-09 DIAGNOSIS — R32: ICD-10-CM

## 2018-07-09 PROCEDURE — 99310 SBSQ NF CARE HIGH MDM 45: CPT

## 2018-07-09 NOTE — CONSULTATION NOTE
Palliative Care Follow Up





- Referral


Referring Provider: Dr. Luis A Argueta recently from Dr. Alas


Time of Visit: 6449-1316


Referral setting: Skilled Nursing Facility


Referral Reason: PSP/Goals of Care





- Information Sources


Records reviewed: RN notes reviewed, Previous records reviewed


History/Review of Systems obtained from: Patient, Family (sister Chidi; 

caregiver Mary Tariq)


Exam limitations: Clinical condition (increase difficulty with communication;)





- History of Present Illness Update


Brief HPI Update: 


This is a zen 73-year-old woman with progressive supranuclear palsy, whom I 

have been seen since January 2017.  She has had progressive decline, including 

increased difficulty with dysphagia, necessitating a placement of a feeding 

tube when swallowing was too difficult and resulted in choking on a regular 

basis.  This occurred on 8/22/2017, she had previously been at [facility], but 

needed to be in a higher level of care, that she has been a resident at the 

nursing home since then. Over the last 18 months she has become progressively 

more wheelchair bound, and over the last few weeks has been spending more time 

in bed.  She is a pivot transfer into her tilt in space wheelchair, she does 

have significant difficulty with her vision, so her neck is permanently tilted 

back.  She does have increased upper extremity contractures particularly on the 

right, limited range of motion in her shoulders.  She is able to move her legs 

spontaneously, and follow commands. She is incontinent of urine, occasionally 

incontinent of stool. 





The greatest concern at this point in time, is her increasing inability to 

manage her oral secretions.  She did present with most likely aspiration a few 

weeks ago, her chest x-ray showed perihilar bronchitis.  She did get an course 

of Augmentin, with some improvement.  Unfortunately when she coughs, she is not 

using a cough effort but with propulsion of air through her vocal cords, this 

can be be quite distressing to staff and surrounding residents. She has had 

very little response to cough medicine, I did start her on Hyoscyamine 0.125 mg 

ODT every 4 hours, they have used it with some success, though it is quite 

drying. She has had some improvement with less secretions over the last few days

, but in discussion with patient and sister, goals are to focus on comfort, and 

initiate hospice with patient's continued expected decline and limited life 

expectancy of less than six months given her decline over the last few months 

with decreased ability to manage secretions, more difficulty with communication

, worsening vision, more fatigue, and agreement she wants to stay in current 

setting.





Medications/Allergies





- Medications


Home Medications: 


 Ambulatory Orders











 Medication  Instructions  Recorded  Confirmed


 


Losartan [Cozaar] 12.5 mg PEG DAILY 09/24/13 07/02/18


 


Brimonidine 0.2% Ophth Drops 1 drops EACHEYE BID 03/05/17 07/02/18





[Alphagan P 0.2% Ophth Drops]   


 


Latanoprost 0.005% Ophth Drops 1 drops EACHEYE DAILY 03/05/17 07/02/18





[Xalatan Ophth Drops]   


 


Acetaminophen 500 mg PEG Q6HR PRN 07/19/17 07/02/18


 


Bisacodyl Supp [Dulcolax Supp] 10 mg TX DAILY PRN 07/19/17 07/02/18


 


Escitalopram Oxalate 10 mg PEG DAILY 07/19/17 07/02/18


 


HYDROcod/ACETAM 5/325 [Norco 5/325] 2.5 mg PEG Q8HR PRN 07/19/17 07/02/18


 


Loperamide HCl [Loperamide] 10 ml PEG Q6HR PRN 07/19/17 07/02/18


 


Multivitamin W/Minerals [Theragran 10 ml PEG DAILY 07/19/17 07/02/18





M]   


 


Polyethylene Glycol 3350 [Miralax] 17 gm PEG DAILY 07/19/17 07/02/18


 


HYDROcod/ACETAM 5/325 [Norco 5/325] 2.5 mg PEG Q8HR PRN 09/13/17 07/02/18


 


Ondansetron [Zuplenz] 8 mg PEG Q8HR PRN 09/13/17 07/02/18


 


Senna [Senokot] 8.6 mg PEG .EVERY OTHER DAY 11/05/17 07/02/18


 


Omeprazole Magnesium [Prilosec] 20 mg PEG BID 12/14/17 07/02/18


 


Guaifenesin/Dextromethorphan 5 ml PEG Q6HR PRN 06/11/18 07/02/18





[Tussin Dm Cough Syrup]   


 


Hyoscyamine [Levsin] 0.125 - 0.25 mg SL Q4HR PRN MDD 06/20/18 07/02/18





 secretions  














- Allergies


Allergies/Adverse Reactions: 


 Allergies











Allergy/AdvReac Type Severity Reaction Status Date / Time


 


Iodinated Contrast- Oral and Allergy Severe Respiratory Verified 11/28/17 12:35





IV Dye     





[Iodinated Contrast Media -     





IV Dye]     


 


bee pollen [Bee Pollen] Allergy  Hives Verified 11/28/17 12:35


 


morphine AdvReac Intermediate Hallucinati Verified 11/28/17 12:35





   ons  














Review of Systems





- Constitutional


Constitutional: reports: Fatigue, Weight stable.  denies: Fever, Chills





- Eyes


Eyes: reports: Vision loss (worsening)





- Ears, Nose & Throat


Ears, Nose & Throat: reports: Dry mouth, Other (jaw becoming more tight; 

difficulty opening mouth for oral care; suction at bedside yakur tip)





- Cardiovascular


Cardiovascular: denies: Chest pain





- Respiratory


Respiratory: reports: Cough (improved over last few days; still difficult to 

manage secretions; fatiguing to patient with cough effort), Other (unable to 

take deep breath).  denies: SOB at rest





- Gastrointestinal


Gastrointestinal: reports: Other (TF jevity;).  denies: Constipation (regular 

inc. BM today), Reflux/heartburn





- Genitourinary


Genitourinary: reports: Incontinence.  denies: Dysuria





- Musculoskeletal


Musculoskeletal: reports: Stiffness, Limited range of motion (contractures RUE 

greater than left), Muscle weakness, Transfer issues (pivot transfers with 1-2 

people to tilt in space wheelchair; likes OWN bed; does not want hospital bed; 

sister and patient aware of risk)





- Integumentary


Integumentary: reports: Dryness





- Neurological


Neurological: reports: General weakness, Slurred speech (speech soft; more 

difficult to understand;)





- Psychiatric


Psychiatric: reports: Hallucinations (at times; are not problematic for patient)

.  denies: Depression, Anxiety





- Endocrine


Endocrine: reports: Diabetes type 2 (well controlled no med needed)





- Hematologic/Lymphatic


Hematologic/Lymphatic: reports: Recurrent infections (second tx for resp 

infection last few months; hx of UTIs/ear infection)





- All Other Systems


All Other Systems: reports: Reviewed and negative





Physical Exam





- Vital Signs


Temperature: 96.8 C


Pulse Rate: 64


Respiratory Rate: 20


O2 Saturation: 95 (ra @ rest)


Blood Pressure: 112/64





- Physical Exam


General Appearance: positive: Alert.  negative: Anxious


Eyes Bilateral: positive: Other (difficulty focusing on me;)


ENT: positive: Dry mucous membranes.  negative: Other (no s/s candidiasis)


Neck: positive: Stiff neck (tipped back; shoulders tense; has started massage 

weekly)


Cardiovascular: positive: Regular rate & rhythm


Respiratory: positive: Diminished throughout (difficulty taking deep breath).  

negative: Wheezes, Rales, Rhonchi


Abdomen: positive: Soft, Nml bowel sounds, Other (right soft hernia mid abd/not 

painful)


Skin: positive: Dryness.  negative: Pressure wound


Extremities: positive: No pedal edema.  negative: Full ROM (RUE with 

contractures mod/LUE mild)


Neurologic/Psychiatric: positive: Slurred/abnml speech, Other (patient with 

increase difficulty communicating; needs yes/no; few sentences; still made jokes

; some echoing)





Palliative Care





- POLST


Patient has POLST: Yes


POLST Status: DNR, Comfort Measures (new DNAR filled out with current goals in 

transition to hospice; sister aware to sign;)


Pain: Pain unchanged, Location (bilateral shoulder discomfort; current low dose 

hydrocodone has been effective;)


Performance Status: 


Patient dependent for dressing, 1-2 person pivot transfers depending on 

impulsivity and staff, has been showered twice a week by SNF.  Does have 

private caregiver Teo, she does provide companionship, and some personal care 

support.  She comes about 4 times a week.








- Palliative Care


Discussion: 


Discussed with patient in follow-up of our conversation last week, she does 

remember some of it.  We did review the seriousness of her illness, and her 

continued decline did redefine and reiterate her wishes to stay close to her 

family, and focus on comfort and quality of life.  I had discussed with the 

hospice medical director, they are willing to take her, I do suspect she will 

have continued decline regarding to her disease process, this patient had been 

shared with her sister as well who gave me permission to proceed with a 

conversation regarding hospice.





Conversation with Dara, regarding hospice, discussed support as far as regular 

visiting nurses, increased bathing support and "spa treatments", patient does 

really enjoy touch and massage.  She always dresses up, enjoys her "quinten" and 

jewelry, but likes to have her makeup on.  Shared with her my worries, that as 

things change, she and Chidi her will need more support, and would like to 

expand that team to hospice.  Defined role of  hospice is focusing on living, 

but I did not know how much longer she had, but we could both agree things are 

getting worse. Revisited getting permission to move forward with hospice and 

the focus is on her comfort several ways, and she said yes to this service and 

goal. Reviewed this with Chidi, she will expect call from hospice, aware she 

is the one to sign paperwork, though patient can participate still in shared 

decision making, patient's ability to present with medical decision making 

capacity is limited. 





Support provided for Mary Tairq her caregiver as well, has been constant over 

the last 2 years, expressing her feelings of anticipatory loss and grief, she 

confirms she has seen the fairly significant changes over the last couple of 

months as well.





Patient has a strong babatunde, and is not scared or distressed by our conversation

, is already supported by the  through palliative care. 








Impression and Recommendations





- Palliative Care


Impression: 


This is a zen 73-year-old woman with progressive supranuclear palsy, she 

continues on a slow but steady decline. Goals were reviewed with both patient 

and sister, will transition to hospice support.  





Recommendations/Counseling Done: 


1. PSP. Patient with short reprieve given her difficulty with secretions, has 

improved over the last couple days.  She still has difficulty taking a deep 

breath, is unable to clear adequately, does get adequate hyoscyamine. Patient 

with decline evidenced by continued deterioration of her vision, for her 

respiratory effort, increased difficulty managing his secretions, and 

functional decline.  Given concern for acute respiratory demise, will 

transition to hospice for more intense support, and monitoring of symptoms.  

Patient's goals are reflective of wanting to stay in her current situation, 

will transition to comfort focused treatment.


2. Advanced care planning.  Follow-up with patient and sister regarding 

transitioning to hospice, counseling done regarding the continuum of care, 

sister wanting palliative care APN to stay involved given long-term 

relationship and ability to communicate with her regarding "hard subjects".  

This is been discussed with the medical hospice director, will continue to see 

patient either as the attending or support. Dr. Argueta aware of pending 

transition to hospice, in agreement, has only had his team visit with her for 

initial contact, Dr. Alas recently transitioned patient, she had been her 

long term PCP. 





Time Spent: 


45 minutes with greater than 50% of this done in counseling regarding 

transitioning to hospice, goals of care, and anticipatory guidance.
able to follow multistep instructions/100% of the time

## 2021-12-16 NOTE — XRAY REPORT
EXAM: 

CHEST RADIOGRAPHY

 

EXAM DATE: 11/28/2017 12:45 PM.

 

CLINICAL HISTORY: Aspiration.

 

COMPARISON: None.

 

TECHNIQUE: 1 view.

 

FINDINGS:

Lungs/Pleura: No focal consolidation. No thorax or large pleural effusion.

 

Mediastinum: Upper normal size of the cardiac silhouette. Cardiac valve prosthesis noted.

 

Other: Percutaneous feeding tube noted.

 

IMPRESSION: No plain radiographic evidence of aspiration.

 

[facility]

Referring Provider Line: [phone]

 

SITE ID: [id number] normal sinus rhythm

## 2022-10-11 NOTE — CT REPORT
EXAM:

CT CERVICAL SPINE WITHOUT CONTRAST

 

DATE: 6/15/2017 04:04 AM

 

HISTORY: Unwitnessed fall. Evaluate for cervical spine fracture

 

COMPARISONS: 03/05/2017.

 

TECHNIQUE: Thin-section axial images were acquired of the cervical spine without contrast. Post-proce
ssing: Coronal and sagittal reformats. Other: None.

 

In accordance with CT protocol optimization, one or more of the following dose reduction techniques w
ere utilized for this exam: automated exposure control, adjustment of mA and/or KV based on patient s
ize, or use of iterative reconstructive technique.

 

FINDINGS: 

Alignment: Normal. No scoliosis or spondylolisthesis.

 

Bones: No fracture or bone lesion.

 

Interspace Levels/Facets:

Stable degenerative changes in the cervical spine, most pronounced involving the disk spaces from C3-
C4 through C6-C7, inclusive. Endplate osteophyte narrows the central canal and foramina bilaterally a
t these levels, unchanged.

 

Musculature: Normal. No fatty atrophy.

 

Other: The paravertebral and prevertebral soft tissues are normal. The lung apices are clear.

 

IMPRESSION: Stable degenerative changes in the cervical spine since 03/05/2017. No fracture is identi
fied.

 

[facility]

Referring Provider Line: [phone]

 

SITE ID: [id number]
EXAM:

CT HEAD

 

EXAM DATE: 6/15/2017 04:04 AM.

 

CLINICAL HISTORY: Unwitnessed fall. Head injury.

 

COMPARISON: 03/05/2017.

 

TECHNIQUE: Multiaxial CT images were obtained from the foramen magnum to the vertex. IV contrast: Non
e. Reformats: Coronal.

 

In accordance with CT protocol optimization, one or more of the following dose reduction techniques w
ere utilized for this exam: automated exposure control, adjustment of mA and/or KV based on patient s
ize, or use of iterative reconstructive technique.

 

FINDINGS:

Parenchyma: No intraparenchymal hemorrhage. No evidence of mass, midline shift, or CT findings of inf
arction. Gray-white differentiation is distinct.

 

Extraaxial Spaces: Normal for age. No subdural or epidural collections identified.

 

Ventricles: Normal in size and position.

 

Sinuses: Imaged paranasal sinuses, orbits, and mastoids show no significant abnormality.

 

Bones: No evidence of fracture or calvarial defect.

 

Other: Extracranial soft tissue swelling over the left parietal region.

 

IMPRESSION: No acute or focal intracranial abnormality.

 

[facility]

Referring Provider Line: [phone]

 

SITE ID: [id number]
Yes - the patient is able to be screened